# Patient Record
Sex: FEMALE | Race: BLACK OR AFRICAN AMERICAN | Employment: FULL TIME | RURAL
[De-identification: names, ages, dates, MRNs, and addresses within clinical notes are randomized per-mention and may not be internally consistent; named-entity substitution may affect disease eponyms.]

---

## 2017-03-31 ENCOUNTER — OFFICE VISIT (OUTPATIENT)
Dept: FAMILY MEDICINE CLINIC | Age: 52
End: 2017-03-31

## 2017-03-31 VITALS
HEART RATE: 96 BPM | DIASTOLIC BLOOD PRESSURE: 82 MMHG | TEMPERATURE: 98.5 F | RESPIRATION RATE: 20 BRPM | BODY MASS INDEX: 31.75 KG/M2 | WEIGHT: 190.6 LBS | SYSTOLIC BLOOD PRESSURE: 125 MMHG | OXYGEN SATURATION: 100 % | HEIGHT: 65 IN

## 2017-03-31 DIAGNOSIS — E04.9 GOITER: ICD-10-CM

## 2017-03-31 DIAGNOSIS — J01.00 ACUTE MAXILLARY SINUSITIS, RECURRENCE NOT SPECIFIED: Primary | ICD-10-CM

## 2017-03-31 DIAGNOSIS — R68.89 FLU-LIKE SYMPTOMS: ICD-10-CM

## 2017-03-31 LAB
QUICKVUE INFLUENZA TEST: NEGATIVE
VALID INTERNAL CONTROL?: YES

## 2017-03-31 RX ORDER — PREDNISONE 5 MG/1
TABLET ORAL
Qty: 21 TAB | Refills: 0 | Status: SHIPPED | OUTPATIENT
Start: 2017-03-31 | End: 2018-10-24 | Stop reason: ALTCHOICE

## 2017-03-31 RX ORDER — MULTIVIT WITH MINERALS/HERBS
1 TABLET ORAL DAILY
COMMUNITY
End: 2019-11-22

## 2017-03-31 RX ORDER — TRIAMCINOLONE ACETONIDE 55 UG/1
2 SPRAY, METERED NASAL DAILY
Qty: 16.5 BOTTLE | Refills: 2 | Status: SHIPPED | OUTPATIENT
Start: 2017-03-31 | End: 2021-08-18 | Stop reason: ALTCHOICE

## 2017-03-31 RX ORDER — VITAMIN E 268 MG
CAPSULE ORAL DAILY
COMMUNITY
End: 2017-04-19 | Stop reason: ALTCHOICE

## 2017-03-31 RX ORDER — SULFAMETHOXAZOLE AND TRIMETHOPRIM 800; 160 MG/1; MG/1
1 TABLET ORAL 2 TIMES DAILY
Qty: 20 TAB | Refills: 0 | Status: SHIPPED | OUTPATIENT
Start: 2017-03-31 | End: 2017-04-19 | Stop reason: ALTCHOICE

## 2017-03-31 RX ORDER — METFORMIN HYDROCHLORIDE 500 MG/1
TABLET, EXTENDED RELEASE ORAL
Refills: 6 | COMMUNITY
Start: 2017-01-31 | End: 2018-10-24 | Stop reason: SINTOL

## 2017-03-31 RX ORDER — ASCORBIC ACID 500 MG
TABLET ORAL
COMMUNITY
End: 2019-11-22

## 2017-03-31 NOTE — PROGRESS NOTES
Subjective:   Umm Schulte is a 49 y.o. female who complains of congestion, nasal blockage, fever and green nasal discharge for 3 days, gradually worsening since that time. She denies a history of shortness of breath and wheezing. Evaluation to date: none. Treatment to date: antihistamines, OTC products. Patient does not smoke cigarettes. Relevant PMH: No pertinent additional PMH. Patient Active Problem List    Diagnosis Date Noted    Prediabetes 2014    Hypercholesterolemia 2014    Polycystic ovarian syndrome 2011    Goiter 2011     Current Outpatient Prescriptions   Medication Sig Dispense Refill    vitamin E (AQUA GEMS) 400 unit capsule Take  by mouth daily.  ascorbic acid, vitamin C, (VITAMIN C) 500 mg tablet Take  by mouth.  Vit A,C & W-Rmin-X4-Lut-Mn-Glu 1000 unit-300mg -100 unit-2 mg tab Take  by mouth.  zinc 50 mg tab tablet Take  by mouth daily.  b complex vitamins (B COMPLEX 1) tablet Take 1 Tab by mouth daily.  metFORMIN ER (GLUCOPHAGE XR) 500 mg tablet TAKE 1 TABLET BY MOUTH EVERY DAY FOR 30 DAYS  6    LORATADINE (CLARITIN PO) Take  by mouth.  magnesium 500 mg Tab Take  by mouth.  cholecalciferol, vitamin d3, 10,000 unit cap Take  by mouth daily.  acyclovir (ZOVIRAX) 5 % ointment Apply  to affected area every three (3) hours.  2 g 0     Allergies   Allergen Reactions    Biaxin [Clarithromycin] Nausea and Vomiting     dizziness    Ciprofloxacin Other (comments)     Joint pian, dizziness, lightheadedness, confusion    Pcn [Penicillins] Rash     Past Medical History:   Diagnosis Date    GERD (gastroesophageal reflux disease) 2011    Goiter 2011    Hypercholesterolemia 2014    Polycystic ovarian syndrome 2011     Past Surgical History:   Procedure Laterality Date    ABDOMEN SURGERY PROC UNLISTED      DELIVERY   47    HX GYN      c section     Family History   Problem Relation Age of Onset    Diabetes Mother     Alzheimer Mother     Cancer Mother      endometrial cancer    Elevated Lipids Father     Cancer Father      prostate cancer    Diabetes Sister     Cancer Sister      endometrial cancer    Heart Disease Maternal Grandfather     Breast Cancer Paternal Aunt     Breast Cancer Cousin      Social History   Substance Use Topics    Smoking status: Never Smoker    Smokeless tobacco: Never Used    Alcohol use 0.0 oz/week     0 Standard drinks or equivalent per week      Comment: occ wine        Review of Systems  Pertinent items are noted in HPI. She has been to ENDO Dr. Camilla Nielson for PCOS. Started on low dose Metformin. Did not get thyroid US done last year. Objective:     Visit Vitals    /82 (BP 1 Location: Right arm, BP Patient Position: Sitting)    Pulse 96    Temp 98.5 °F (36.9 °C) (Oral)    Resp 20    Ht 5' 5\" (1.651 m)    Wt 190 lb 9.6 oz (86.5 kg)    LMP 03/27/2017    SpO2 100%    BMI 31.72 kg/m2     General:  alert, cooperative, no distress   Eyes: negative   Ears: normal TM's and external ear canals AU   Sinuses: tenderness over bilateral maxillary and frontal   Mouth:  Lips, mucosa, and tongue normal. Teeth and gums normal   Neck: supple, symmetrical, trachea midline and no adenopathy. Heart: S1 and S2 normal, no murmurs noted. Lungs: clear to auscultation bilaterally   Abdomen:         Results for orders placed or performed in visit on 03/31/17   AMB POC RAPID INFLUENZA TEST   Result Value Ref Range    VALID INTERNAL CONTROL POC Yes     QuickVue Influenza test Negative Negative       Assessment/Plan:   sinusitis      ICD-10-CM ICD-9-CM    1. Acute maxillary sinusitis, recurrence not specified J01.00 461.0 predniSONE (STERAPRED) 5 mg dose pack      trimethoprim-sulfamethoxazole (BACTRIM DS) 160-800 mg per tablet      triamcinolone (NASACORT AQ) 55 mcg nasal inhaler   2. Flu-like symptoms R68.89 780.99 AMB POC RAPID INFLUENZA TEST   3.  Goiter E04.9 240.9 US THYROID/PARATHYROID/SOFT TISS   . Request ENDO records from Dr. José Sorto. Order US thyroid. Patient Instructions        Goiter: Care Instructions  Your Care Instructions    A goiter is an enlarged thyroid gland. It can cause swelling in your neck. Your thyroid is found in the front of your neck. It makes a hormone that controls how your body uses energy. Goiters are caused by different things. Some are caused by high or low levels of thyroid hormone. Others are caused by too little iodine in the diet, or a growth or disease in the thyroid. In the [place], most goiters are caused by long-term autoimmune thyroiditis. This is also called Hashimoto's thyroiditis. It happens when the body's immune system damages the thyroid. You may take thyroid hormone to reduce the size of your goiter. Or you may need surgery or radioactive iodine treatment. Some people don't need any treatment. They only need to watch for changes in the goiter. Follow-up care is a key part of your treatment and safety. Be sure to make and go to all appointments, and call your doctor if you are having problems. It's also a good idea to know your test results and keep a list of the medicines you take. How can you care for yourself at home? · Be safe with medicines. Take your medicines exactly as prescribed. Call your doctor if you think you are having a problem with your medicine. You will get more details on the specific medicines your doctor prescribes. When should you call for help? Call 911 anytime you think you may need emergency care. For example, call if:  · You have trouble breathing. Watch closely for changes in your health, and be sure to contact your doctor if:  · Your eyes turn red and bulge. · You have trouble swallowing. · You feel very tired or weak. · You lose weight but are eating the same or more than usual.  Where can you learn more? Go to http://estefani-carl.info/.   Enter Y129 in the search box to learn more about \"Goiter: Care Instructions. \"  Current as of: July 28, 2016  Content Version: 11.2  © 0149-6661 [employer]. Care instructions adapted under license by [employer] (which disclaims liability or warranty for this information). If you have questions about a medical condition or this instruction, always ask your healthcare professional. [place] [address] any warranty or liability for your use of this information.

## 2017-03-31 NOTE — MR AVS SNAPSHOT
Visit Information Date & Time Provider Department Dept. Phone Encounter #  
 [id number]/19/[id number]  4:81 AM Jaspreet [facility] [address] [phone] [id number] Upcoming Health Maintenance Date Due FOBT Q 1 YEAR AGE 50-75 9/8/2016 PAP AKA CERVICAL CYTOLOGY 2/17/2017 BREAST CANCER SCRN MAMMOGRAM 10/12/2018 DTaP/Tdap/Td series (2 - Td) 7/8/2026 Allergies as of 3/31/2017  Review Complete On: 8/78/1033 By: Jaspreet Moreno MD  
  
 Severity Noted Reaction Type Reactions Biaxin [Clarithromycin]  02/18/2011    Nausea and Vomiting  
 dizziness Ciprofloxacin  02/18/2011    Other (comments) Joint pian, dizziness, lightheadedness, confusion Pcn [Penicillins]  02/18/2011    Rash Current Immunizations  Never Reviewed No immunizations on file. Not reviewed this visit You Were Diagnosed With   
  
 Codes Comments Acute maxillary sinusitis, recurrence not specified    -  Primary ICD-10-CM: J01.00 ICD-9-CM: 461.0 Flu-like symptoms     ICD-10-CM: R68.89 ICD-9-CM: 780.99 Goiter     ICD-10-CM: E04.9 ICD-9-CM: 240.9 Vitals BP Pulse Temp Resp Height(growth percentile) Weight(growth percentile) 125/82 (BP 1 Location: Right arm, BP Patient Position: Sitting) 96 98.5 °F (36.9 °C) (Oral) 20 5' 5\" (1.651 m) 190 lb 9.6 oz (86.5 kg) LMP SpO2 BMI OB Status Smoking Status 03/27/2017 100% 31.72 kg/m2 Having regular periods Never Smoker BMI and BSA Data Body Mass Index Body Surface Area 31.72 kg/m 2 1.99 m 2 Preferred Pharmacy Pharmacy Name Phone [facility] [address] [facility] Your Updated Medication List  
  
   
This list is accurate as of: 3/31/17 10:43 AM.  Always use your most recent med list.  
  
  
  
  
 acyclovir 5 % ointment Commonly known as:  ZOVIRAX Apply  to affected area every three (3) hours. B COMPLEX 1 tablet Generic drug:  b complex vitamins Take 1 Tab by mouth daily. cholecalciferol (vitamin d3) 10,000 unit Cap Take  by mouth daily. CLARITIN PO Take  by mouth.  
  
 magnesium 500 mg Tab Take  by mouth.  
  
 metFORMIN  mg tablet Commonly known as:  GLUCOPHAGE XR  
TAKE 1 TABLET BY MOUTH EVERY DAY FOR 30 DAYS  
  
 predniSONE 5 mg dose pack Commonly known as:  STERAPRED See administration instruction per 5mg dose pack  
  
 triamcinolone 55 mcg nasal inhaler Commonly known as:  NASACORT AQ  
2 Sprays by Both Nostrils route daily. trimethoprim-sulfamethoxazole 160-800 mg per tablet Commonly known as:  BACTRIM DS Take 1 Tab by mouth two (2) times a day. Vit A,C & I-Oebi-S9-Lut-Mn-Glu 1000 unit-300mg -100 unit-2 mg Tab Take  by mouth. VITAMIN C 500 mg tablet Generic drug:  ascorbic acid (vitamin C) Take  by mouth.  
  
 vitamin E 400 unit capsule Commonly known as:  [address] Take  by mouth daily. zinc 50 mg Tab tablet Take  by mouth daily. Prescriptions Sent to Pharmacy Refills  
 predniSONE (STERAPRED) 5 mg dose pack 0 Sig: See administration instruction per 5mg dose pack Class: Normal  
 Pharmacy: [place] [id number] Ph #: [phone]  
 trimethoprim-sulfamethoxazole (BACTRIM DS) 160-800 mg per tablet 0 Sig: Take 1 Tab by mouth two (2) times a day. Class: Normal  
 Pharmacy: [facility] [id number] [place] [address] Ph #: [phone] Route: Oral  
 triamcinolone (NASACORT AQ) 55 mcg nasal inhaler 2 Si Sprays by Both Nostrils route daily. Class: Normal  
 Pharmacy: [facility] [id number] [place] [address] Ph #: [phone] Route: Both Nostrils We Performed the Following AMB POC RAPID INFLUENZA TEST [[address] CPT(R)] To-Do List   
 2017 Imaging:  US THYROID/PARATHYROID/SOFT TISS Patient Instructions Goiter: Care Instructions Your Care Instructions A goiter is an enlarged thyroid gland. It can cause swelling in your neck. Your thyroid is found in the front of your neck. It makes a hormone that controls how your body uses energy. Goiters are caused by different things. Some are caused by high or low levels of thyroid hormone. Others are caused by too little iodine in the diet, or a growth or disease in the thyroid. In the [place], most goiters are caused by long-term autoimmune thyroiditis. This is also called Hashimoto's thyroiditis. It happens when the body's immune system damages the thyroid. You may take thyroid hormone to reduce the size of your goiter. Or you may need surgery or radioactive iodine treatment. Some people don't need any treatment. They only need to watch for changes in the goiter. Follow-up care is a key part of your treatment and safety. Be sure to make and go to all appointments, and call your doctor if you are having problems. It's also a good idea to know your test results and keep a list of the medicines you take. How can you care for yourself at home? · Be safe with medicines. Take your medicines exactly as prescribed. Call your doctor if you think you are having a problem with your medicine. You will get more details on the specific medicines your doctor prescribes. When should you call for help? Call 911 anytime you think you may need emergency care. For example, call if: 
· You have trouble breathing. Watch closely for changes in your health, and be sure to contact your doctor if: 
· Your eyes turn red and bulge. · You have trouble swallowing. · You feel very tired or weak. · You lose weight but are eating the same or more than usual. 
Where can you learn more? Go to http://estefani-carl.info/. Enter U150 in the search box to learn more about \"Goiter: Care Instructions. \" Current as of: July 28, 2016 Content Version: 11.2 © 0478-1770 [employer], Incorporated. Care instructions adapted under license by [employer] (which disclaims liability or warranty for this information). If you have questions about a medical condition or this instruction, always ask your healthcare professional. [address] any warranty or liability for your use of this information. Introducing [facility]! Dear Nicole Brannon: Thank you for requesting a Picapica account. Our records indicate that you already have an active Picapica account. You can access your account anytime at [email or web address] ([email or web address]). Jirafe/[facility] ([facility]) Did you know that you can access your hospital and ER discharge instructions at any time in Picapica? You can also review all of your test results from your hospital stay or ER visit. Additional Information If you have questions, please visit the Frequently Asked Questions section of the Picapica website at [email or web address]. Remember, Picapica is NOT to be used for urgent needs. For medical emergencies, dial 911. Now available from your iPhone and Android! Please provide this summary of care documentation to your next provider. If you have any questions after today's visit, please call [phone].

## 2017-03-31 NOTE — PATIENT INSTRUCTIONS
Goiter: Care Instructions  Your Care Instructions    A goiter is an enlarged thyroid gland. It can cause swelling in your neck. Your thyroid is found in the front of your neck. It makes a hormone that controls how your body uses energy. Goiters are caused by different things. Some are caused by high or low levels of thyroid hormone. Others are caused by too little iodine in the diet, or a growth or disease in the thyroid. In the [place], most goiters are caused by long-term autoimmune thyroiditis. This is also called Hashimoto's thyroiditis. It happens when the body's immune system damages the thyroid. You may take thyroid hormone to reduce the size of your goiter. Or you may need surgery or radioactive iodine treatment. Some people don't need any treatment. They only need to watch for changes in the goiter. Follow-up care is a key part of your treatment and safety. Be sure to make and go to all appointments, and call your doctor if you are having problems. It's also a good idea to know your test results and keep a list of the medicines you take. How can you care for yourself at home? · Be safe with medicines. Take your medicines exactly as prescribed. Call your doctor if you think you are having a problem with your medicine. You will get more details on the specific medicines your doctor prescribes. When should you call for help? Call 911 anytime you think you may need emergency care. For example, call if:  · You have trouble breathing. Watch closely for changes in your health, and be sure to contact your doctor if:  · Your eyes turn red and bulge. · You have trouble swallowing. · You feel very tired or weak. · You lose weight but are eating the same or more than usual.  Where can you learn more? Go to http://estefani-carl.info/. Enter G144 in the search box to learn more about \"Goiter: Care Instructions. \"  Current as of: July 28, 2016  Content Version: 11.2  © 4739-5675 [employer]. Care instructions adapted under license by [employer] (which disclaims liability or warranty for this information). If you have questions about a medical condition or this instruction, always ask your healthcare professional. [address] any warranty or liability for your use of this information.

## 2017-03-31 NOTE — PROGRESS NOTES
Identified pt with two pt identifiers(name and ). Chief Complaint   Patient presents with    Cold Symptoms     started wed     Fever     low grade     Sinus Infection     face pain, teeth hurt        Health Maintenance Due   Topic    FOBT Q 1 YEAR AGE 50-75     PAP AKA CERVICAL CYTOLOGY    10/16 Dr Rafael Batres from Last 3 Encounters:   17 190 lb 9.6 oz (86.5 kg)   16 193 lb (87.5 kg)   12/14/15 192 lb (87.1 kg)     Temp Readings from Last 3 Encounters:   17 98.5 °F (36.9 °C) (Oral)   16 97.8 °F (36.6 °C) (Oral)   12/14/15 98.1 °F (36.7 °C) (Oral)     BP Readings from Last 3 Encounters:   17 125/82   16 101/67   12/14/15 111/81     Pulse Readings from Last 3 Encounters:   17 96   16 86   12/14/15 89         Learning Assessment:  :     Learning Assessment 2014   PRIMARY LEARNER Patient   HIGHEST LEVEL OF EDUCATION - PRIMARY LEARNER  > 4 YEARS OF COLLEGE   BARRIERS PRIMARY LEARNER NONE   CO-LEARNER CAREGIVER No   PRIMARY LANGUAGE ENGLISH   LEARNER PREFERENCE PRIMARY DEMONSTRATION     READING   ANSWERED BY self   RELATIONSHIP SELF       Depression Screening:  :     PHQ 2 / 9, over the last two weeks 3/31/2017   Little interest or pleasure in doing things Not at all   Feeling down, depressed or hopeless Not at all   Total Score PHQ 2 0       Fall Risk Assessment:  :     Fall Risk Assessment, last 12 mths 3/31/2017   Able to walk? Yes   Fall in past 12 months? No       Abuse Screening:  :     Abuse Screening Questionnaire 3/31/2017 2014   Do you ever feel afraid of your partner? N N   Are you in a relationship with someone who physically or mentally threatens you? N N   Is it safe for you to go home?  Y Y       Coordination of Care Questionnaire:  :     1) Have you been to an emergency room, urgent care clinic since your last visit? no   Hospitalized since your last visit? no             2) Have you seen or consulted any other health care providers outside of [address] [place] [address] since your last visit? yes  Dr Matias Nose endocrinologist  (Include any pap smears or colon screenings in this section.)    3) Do you have an Advance Directive on file? yes  Are you interested in receiving information about Advance Directives? no    Reviewed record in preparation for visit and have obtained necessary documentation. Medication reconciliation up to date and corrected with patient at this time.      Results for orders placed or performed in visit on 03/31/17   AMB POC RAPID INFLUENZA TEST   Result Value Ref Range    VALID INTERNAL CONTROL POC Yes     QuickVue Influenza test Negative Negative

## 2017-04-03 ENCOUNTER — TELEPHONE (OUTPATIENT)
Dept: FAMILY MEDICINE CLINIC | Age: 52
End: 2017-04-03

## 2017-04-03 RX ORDER — AZITHROMYCIN 250 MG/1
TABLET, FILM COATED ORAL
Qty: 6 TAB | Refills: 0 | Status: SHIPPED | OUTPATIENT
Start: 2017-04-03 | End: 2017-04-08

## 2017-04-03 NOTE — TELEPHONE ENCOUNTER
Forwarded from call center. .. Pt stated that she would like for a call back in regards to her prescription that was written for her today. She stated that it is causing her abdominal pain and that she would like for something else to be sent to her [facility] pharmacy at [phone] for her.  Her best contact is [phone].

## 2017-04-03 NOTE — TELEPHONE ENCOUNTER
Patient called regarding previous message from call center and is worried about having this medication reaction. Patient requests a call back from Dr Kim Kingsley or nurse as soon as they may have a chance.      Best contact: [phone]

## 2017-04-12 ENCOUNTER — HOSPITAL ENCOUNTER (OUTPATIENT)
Dept: ULTRASOUND IMAGING | Age: 52
Discharge: HOME | End: 2017-04-12
Attending: FAMILY MEDICINE
Payer: COMMERCIAL

## 2017-04-12 DIAGNOSIS — E04.9 GOITER: ICD-10-CM

## 2017-04-12 PROCEDURE — 76536 US EXAM OF HEAD AND NECK: CPT

## 2017-04-13 ENCOUNTER — TELEPHONE (OUTPATIENT)
Dept: FAMILY MEDICINE CLINIC | Age: 52
End: 2017-04-13

## 2017-04-13 DIAGNOSIS — E04.1 LEFT THYROID NODULE: Primary | ICD-10-CM

## 2017-04-13 DIAGNOSIS — E04.1 NODULE OF LEFT LOBE OF THYROID GLAND: ICD-10-CM

## 2017-04-13 NOTE — TELEPHONE ENCOUNTER
Call pt to discuss neck US. Refer to ENDO for BX of large left thyroid nodule.  She has seen Dr. Kwesi Cortes in past.

## 2017-04-17 NOTE — TELEPHONE ENCOUNTER
Spoke with pt. She has most recently seen Dr. Esthela Kasper at [address] [place] [address] Drive last July for PCOS. We will fax to him the US report and pt will set up appt to evaluate large thyroid nodule. Please fax US report to Dr. Clary Sykes office.   [facility] phone # [phone]

## 2017-04-19 ENCOUNTER — OFFICE VISIT (OUTPATIENT)
Dept: INTERNAL MEDICINE CLINIC | Age: 52
End: 2017-04-19

## 2017-04-19 VITALS
HEIGHT: 65 IN | BODY MASS INDEX: 31.72 KG/M2 | HEART RATE: 93 BPM | TEMPERATURE: 98.2 F | WEIGHT: 190.4 LBS | RESPIRATION RATE: 16 BRPM | OXYGEN SATURATION: 97 % | SYSTOLIC BLOOD PRESSURE: 123 MMHG | DIASTOLIC BLOOD PRESSURE: 75 MMHG

## 2017-04-19 DIAGNOSIS — E04.1 LEFT THYROID NODULE: ICD-10-CM

## 2017-04-19 DIAGNOSIS — E04.9 GOITER: Primary | ICD-10-CM

## 2017-04-19 NOTE — PROGRESS NOTES
Reviewed record  In preparation for visit and have obtained necessary documentation. 1. Have you been to the ER, urgent care clinic since your last visit? Hospitalized since your last visit?no  2. Have you seen or consulted any other health care providers outside of the [address] [place] [address] since your last visit? Include any pap smears or colon screening. Saw PCP Dr Karina Abrams and Dr Mina(endo)  Advanced directives on file.

## 2017-04-19 NOTE — PATIENT INSTRUCTIONS
Thyroid Nodules: Care Instructions  Your Care Instructions  Thyroid nodules are growths or lumps in the thyroid gland. Your thyroid is in the front of your neck. It controls how your body uses energy. You may have tests to see if the nodule is caused by cancer. Most nodules aren't cancer and don't cause problems. Many don't even need treatment. If you do have cancer, it can usually be cured. Treatment will probably include surgery. You may also get radioactive iodine treatment. If your thyroid can't make thyroid hormone after treatment, you can take a pill every day to replace the hormone. Follow-up care is a key part of your treatment and safety. Be sure to make and go to all appointments, and call your doctor if you are having problems. It's also a good idea to know your test results and keep a list of the medicines you take. How can you care for yourself at home? · Be safe with medicines. If you take thyroid hormone medicine:  ¨ Take it exactly as prescribed. Call your doctor if you think you are having a problem with your medicine. If you take the right amount and don't skip doses, you probably won't have side effects. ¨ Do not take it with calcium, vitamins, or iron. ¨ Try not to miss a dose. ¨ Do not take extra doses. This will not help you get better any faster. It may also cause side effects. ¨ Tell your doctor about any medicines you take. This includes over-the-counter medicines. ¨ Wear a medical alert bracelet or necklace that says you take thyroid hormones. You can buy these at most drugstores. When should you call for help? Call 911 anytime you think you may need emergency care. For example, call if:  · You lose consciousness. Call your doctor now or seek immediate medical care if:  · You have shortness of breath. Watch closely for changes in your health, and be sure to contact your doctor if:  · You have pain in your neck, jaw, or ear. · You have problems swallowing.   · You have a \"tickle\" in your throat. · You feel weak and tired. · You have nervousness, a fast heartbeat, hand tremors, problems sleeping, increased sweating, and weight loss. · You do not feel better even though you are taking your medicine. Where can you learn more? Go to http://estefani-carl.info/. Enter G405 in the search box to learn more about \"Thyroid Nodules: Care Instructions. \"  Current as of: July 28, 2016  Content Version: 11.2  © 3149-2695 [employer]. Care instructions adapted under license by [employer] (which disclaims liability or warranty for this information). If you have questions about a medical condition or this instruction, always ask your healthcare professional. [address] any warranty or liability for your use of this information. Fine-Needle Thyroid Biopsy: Before Your Procedure  What is a needle thyroid biopsy? During a thyroid biopsy, your doctor uses a thin needle to remove a small sample of tissue from your thyroid gland. You may be having the biopsy to find what is causing a lump or growth in your thyroid. The biopsy causes very little pain. But your doctor may need to put the needle into your thyroid more than once. This is done to be sure enough fluid and tissue is taken for the test.  The doctor then looks at the tissue sample under a microscope for cancer, infection, or other thyroid problems. The biopsy is done in a hospital, a clinic, or your doctor's office. During the test, you will lie on your back with a pillow under your shoulders. Your head will be tipped backward and your neck extended. This position pushes the thyroid gland forward. This makes it easier to do the biopsy. You may be given medicine to help you relax. Your doctor may use an ultrasound to guide the placement of the needle. It is important to lie very still during the biopsy.  Do not cough, talk, or swallow when the needle is in place. In some cases, thyroid surgery may be needed if a needle biopsy doesn't give a clear result. This would be done at a different time. In this surgery, the doctor takes a tissue sample through a cut (incision) in the skin. Follow-up care is a key part of your treatment and safety. Be sure to make and go to all appointments, and call your doctor if you are having problems. It's also a good idea to know your test results and keep a list of the medicines you take. What happens before the procedure? Procedures can be stressful. This information will help you understand what you can expect. And it will help you safely prepare for your procedure. You do not need to do anything before your biopsy. You will be awake during the biopsy. Preparing for the procedure  · Understand exactly what procedure is planned, along with the risks, benefits, and other options. · Tell your doctors ALL the medicines, vitamins, supplements, and herbal remedies you take. Some of these can increase the risk of bleeding or interact with anesthesia. · If you take blood thinners, such as warfarin (Coumadin), clopidogrel (Plavix), or aspirin, be sure to talk to your doctor. He or she will tell you if you should stop taking these medicines before your procedure. Make sure that you understand exactly what your doctor wants you to do. · Your doctor will tell you which medicines to take or stop before your procedure. You may need to stop taking certain medicines a week or more before the procedure. So talk to your doctor as soon as you can. · If you have an advance directive, let your doctor know. It may include a living will and a durable power of  for health care. Bring a copy to the hospital. If you don't have one, you may want to prepare one. It lets your doctor and loved ones know your health care wishes. Doctors advise that everyone prepare these papers before any type of surgery or procedure.   What happens on the day of the procedure? · Follow the instructions exactly about when to stop eating and drinking. If you don't, your procedure may be canceled. If your doctor told you to take your medicines on the day of the procedure, take them with only a sip of water. · Take a bath or shower before you come in for your procedure. Do not apply lotions, perfumes, deodorants, or nail polish. · Take off all jewelry and piercings. And take out contact lenses, if you wear them. At the hospital or surgery center  · Bring a picture ID. · The procedure will take about 5 to 10 minutes. Going home  · You may need to stay in the hospital overnight. · Be sure you have someone to drive you home. Anesthesia and pain medicine make it unsafe for you to drive. · You will be given more specific instructions about recovering from your procedure. They will cover things like diet, wound care, follow-up care, driving, and getting back to your normal routine. When should you call your doctor? · You have questions or concerns. · You don't understand how to prepare for your procedure. · You become ill before the procedure (such as fever, flu, or a cold). · You need to reschedule or have changed your mind about having the procedure. Where can you learn more? Go to http://estefani-carl.info/. Enter J510 in the search box to learn more about \"Fine-Needle Thyroid Biopsy: Before Your Procedure. \"  Current as of: July 28, 2016  Content Version: 11.2  © 6109-1150 [employer]. Care instructions adapted under license by [employer] (which disclaims liability or warranty for this information). If you have questions about a medical condition or this instruction, always ask your healthcare professional. [place] [address] any warranty or liability for your use of this information.

## 2017-04-19 NOTE — PROGRESS NOTES
CC:  Chief Complaint   Patient presents with    Thyroid Problem     patient wants a second option on thyroid     HISTORY OF PRESENT ILLNESS  Kaz Grimes is a 49 y.o. female. Presents for second opinion regarding her thyroid. Has thyroid goiter; thyroid US on 4/12/17 showed 3.5 cm left-sided solid thyroid nodule. Dr. Vladislav Latham, her PCP, told her she needs a thyroid biopsy. She tried to see Dr. Deborah Darby [facility]) who manages her PCOS but not able to get an appointment with him until 10/17. She wants to get thyroid biopsy before that time. Has had normal thyroid blood tests in the past; last checked in fall 2016 at [facility]. She has noticed mild discomfort swallowing and sometimes breathing due to thyroid goiter. Has cold intolerance and hair thinning. Denies fatigue, significant weight changes, bowel/skin changes or cardiovascular symptoms.     ROS  Constitutional: negative for fevers, chills, night sweats  ENT:   negative for sore throat, nasal congestion  Respiratory:  negative for cough, wheezing  CV:   negative for chest pain, palpitations, lower extremity edema  GI:   negative for heartburn, abd pain, nausea, vomiting, diarrhea, constipation  Genitourinary: negative for frequency, dysuria and hematuria  Musculoskel: negative for myalgias, arthralgias, back pain, muscle weakness, joint pain  Neurological:  negative for headaches, dizziness  Behavl/Psych: negative for feelings of anxiety, depression, mood change     Patient Active Problem List   Diagnosis Code    Polycystic ovarian syndrome E28.2    Goiter E04.9    Prediabetes R73.03    Hypercholesterolemia E78.00     Past Medical History:   Diagnosis Date    GERD (gastroesophageal reflux disease) 2/18/2011    Goiter 2/18/2011    Hypercholesterolemia 7/30/2014    Polycystic ovarian syndrome 2/18/2011     Allergies   Allergen Reactions    Bactrim [Sulfamethoprim] Diarrhea and Nausea Only     severe nausea/gas pain/    Biaxin [Clarithromycin] Nausea and Vomiting     dizziness    Ciprofloxacin Other (comments)     Joint pian, dizziness, lightheadedness, confusion    Pcn [Penicillins] Rash     Current Outpatient Prescriptions   Medication Sig Dispense Refill    ascorbic acid, vitamin C, (VITAMIN C) 500 mg tablet Take  by mouth.  b complex vitamins (B COMPLEX 1) tablet Take 1 Tab by mouth daily.  metFORMIN ER (GLUCOPHAGE XR) 500 mg tablet TAKE 1 TABLET BY MOUTH EVERY DAY FOR 30 DAYS  6    triamcinolone (NASACORT AQ) 55 mcg nasal inhaler 2 Sprays by Both Nostrils route daily. 16.5 Bottle 2    LORATADINE (CLARITIN PO) Take  by mouth as needed.  acyclovir (ZOVIRAX) 5 % ointment Apply  to affected area every three (3) hours. 2 g 0    magnesium 500 mg Tab Take  by mouth.  predniSONE (STERAPRED) 5 mg dose pack See administration instruction per 5mg dose pack 21 Tab 0         PHYSICAL EXAM  Visit Vitals    /75 (BP 1 Location: Left arm, BP Patient Position: Sitting)    Pulse 93    Temp 98.2 °F (36.8 °C) (Oral)    Resp 16    Ht 5' 5\" (1.651 m)    Wt 190 lb 6.4 oz (86.4 kg)    LMP 03/31/2017    SpO2 97%    BMI 31.68 kg/m2       General: Obese, no distress. HEENT:  Head normocephalic/atraumatic, no scleral icterus. Mild hirsutism at chin; mild hair thinning. Neck: Supple. No lymphadenopathy. Thyroid enlarged diffusely but larger on left side; palpable, soft, non-tender, mobile nodule at left lobe of thyroid. Lungs:  Clear to ausculation bilaterally. Good air movement. Heart:  Regular rate and rhythm, normal S1 and S2, no murmur, gallop, or rub  Extremities: No clubbing, cyanosis, or edema. Neurological: Alert and oriented. Psychiatric: Normal mood and affect. Behavior is normal.         ASSESSMENT AND PLAN    ICD-10-CM ICD-9-CM    1. Goiter E04.9 240.9 TSH AND FREE T4      THYROID PEROXIDASE (TPO) AB   2. Left thyroid nodule E04.1 241.0        Yelitza Urbina was seen today for thyroid problem.     Diagnoses and all orders for this visit:    Goiter  -     TSH AND FREE T4  -     THYROID PEROXIDASE (TPO) AB    I recommend Dr. Phylicia Rebolledo order the following so that the thyroid biopsy can be done at [facility] by Radiology and reviewed by Pathology:        [address]        Associate with diagnosis: Left thyroid nodule    Follow-up Disposition:  Return if symptoms worsen or fail to improve. I explained to her that she does not need to follow up with me. Provided patient and/or family with advanced directive information and answered pertinent questions. Encouraged patient to provide a copy of advanced directive to the office when available. I have discussed the diagnosis with the patient and the intended plan as seen in the above orders. Patient is in agreement. The patient has received an after-visit summary and questions were answered concerning future plans. I have discussed medication side effects and warnings with the patient as well.

## 2017-04-19 NOTE — MR AVS SNAPSHOT
Visit Information Date & Time Provider Department Dept. Phone Encounter #  
 4/19/2017  1:00 PM Doug OliverosMacario [phone] [id number] Follow-up Instructions Return if symptoms worsen or fail to improve. Upcoming Health Maintenance Date Due FOBT Q 1 YEAR AGE 50-75 9/8/2016 PAP AKA CERVICAL CYTOLOGY 2/17/2017 BREAST CANCER SCRN MAMMOGRAM 10/12/2018 DTaP/Tdap/Td series (2 - Td) 7/8/2026 Allergies as of 4/19/2017  Review Complete On: 4/19/2017 By: Doug Oliveros MD  
  
 Severity Noted Reaction Type Reactions Bactrim [Sulfamethoprim]  04/19/2017    Diarrhea, Nausea Only  
 severe nausea/gas pain/  
 Biaxin [Clarithromycin]  02/18/2011    Nausea and Vomiting  
 dizziness Ciprofloxacin  02/18/2011    Other (comments) Joint pian, dizziness, lightheadedness, confusion Pcn [Penicillins]  02/18/2011    Rash Current Immunizations  Never Reviewed No immunizations on file. Not reviewed this visit You Were Diagnosed With   
  
 Codes Comments Goiter    -  Primary ICD-10-CM: E04.9 ICD-9-CM: 240.9 Vitals BP Pulse Temp Resp Height(growth percentile) Weight(growth percentile) 123/75 (BP 1 Location: Left arm, BP Patient Position: Sitting) 93 98.2 °F (36.8 °C) (Oral) 16 5' 5\" (1.651 m) 190 lb 6.4 oz (86.4 kg) LMP SpO2 BMI OB Status Smoking Status 03/31/2017 97% 31.68 kg/m2 Having regular periods Never Smoker BMI and BSA Data Body Mass Index Body Surface Area  
 31.68 kg/m 2 1.99 m 2 Preferred Pharmacy Pharmacy Name Phone [facility] [place], [address]  Your Updated Medication List  
  
   
This list is accurate as of: 4/19/17  1:28 PM.  Always use your most recent med list.  
  
  
  
  
 acyclovir 5 % ointment Commonly known as:  ZOVIRAX Apply  to affected area every three (3) hours. B COMPLEX 1 tablet Generic drug:  b complex vitamins Take 1 Tab by mouth daily. CLARITIN PO Take  by mouth as needed. magnesium 500 mg Tab Take  by mouth.  
  
 metFORMIN  mg tablet Commonly known as:  GLUCOPHAGE XR  
TAKE 1 TABLET BY MOUTH EVERY DAY FOR 30 DAYS  
  
 predniSONE 5 mg dose pack Commonly known as:  STERAPRED See administration instruction per 5mg dose pack  
  
 triamcinolone 55 mcg nasal inhaler Commonly known as:  NASACORT AQ  
2 Sprays by Both Nostrils route daily. VITAMIN C 500 mg tablet Generic drug:  ascorbic acid (vitamin C) Take  by mouth. We Performed the Following THYROID PEROXIDASE (TPO) AB [18104 CPT(R)] TSH AND FREE T4 [64162 CPT(R)] Follow-up Instructions Return if symptoms worsen or fail to improve. Patient Instructions Thyroid Nodules: Care Instructions Your Care Instructions Thyroid nodules are growths or lumps in the thyroid gland. Your thyroid is in the front of your neck. It controls how your body uses energy. You may have tests to see if the nodule is caused by cancer. Most nodules aren't cancer and don't cause problems. Many don't even need treatment. If you do have cancer, it can usually be cured. Treatment will probably include surgery. You may also get radioactive iodine treatment. If your thyroid can't make thyroid hormone after treatment, you can take a pill every day to replace the hormone. Follow-up care is a key part of your treatment and safety. Be sure to make and go to all appointments, and call your doctor if you are having problems. It's also a good idea to know your test results and keep a list of the medicines you take. How can you care for yourself at home? · Be safe with medicines. If you take thyroid hormone medicine: ¨ Take it exactly as prescribed. Call your doctor if you think you are having a problem with your medicine.  If you take the right amount and don't skip doses, you probably won't have side effects. ¨ Do not take it with calcium, vitamins, or iron. ¨ Try not to miss a dose. ¨ Do not take extra doses. This will not help you get better any faster. It may also cause side effects. ¨ Tell your doctor about any medicines you take. This includes over-the-counter medicines. ¨ Wear a medical alert bracelet or necklace that says you take thyroid hormones. You can buy these at most drugstores. When should you call for help? Call 911 anytime you think you may need emergency care. For example, call if: 
· You lose consciousness. Call your doctor now or seek immediate medical care if: 
· You have shortness of breath. Watch closely for changes in your health, and be sure to contact your doctor if: 
· You have pain in your neck, jaw, or ear. · You have problems swallowing. · You have a \"tickle\" in your throat. · You feel weak and tired. · You have nervousness, a fast heartbeat, hand tremors, problems sleeping, increased sweating, and weight loss. · You do not feel better even though you are taking your medicine. Where can you learn more? Go to http://estefani-carl.info/. Enter E139 in the search box to learn more about \"Thyroid Nodules: Care Instructions. \" Current as of: July 28, 2016 Content Version: 11.2 © 1629-1064 [employer]. Care instructions adapted under license by [employer] (which disclaims liability or warranty for this information). If you have questions about a medical condition or this instruction, always ask your healthcare professional. [address] any warranty or liability for your use of this information. Fine-Needle Thyroid Biopsy: Before Your Procedure What is a needle thyroid biopsy? During a thyroid biopsy, your doctor uses a thin needle to remove a small sample of tissue from your thyroid gland.  You may be having the biopsy to find what is causing a lump or growth in your thyroid. The biopsy causes very little pain. But your doctor may need to put the needle into your thyroid more than once. This is done to be sure enough fluid and tissue is taken for the test. 
The doctor then looks at the tissue sample under a microscope for cancer, infection, or other thyroid problems. The biopsy is done in a hospital, a clinic, or your doctor's office. During the test, you will lie on your back with a pillow under your shoulders. Your head will be tipped backward and your neck extended. This position pushes the thyroid gland forward. This makes it easier to do the biopsy. You may be given medicine to help you relax. Your doctor may use an ultrasound to guide the placement of the needle. It is important to lie very still during the biopsy. Do not cough, talk, or swallow when the needle is in place. In some cases, thyroid surgery may be needed if a needle biopsy doesn't give a clear result. This would be done at a different time. In this surgery, the doctor takes a tissue sample through a cut (incision) in the skin. Follow-up care is a key part of your treatment and safety. Be sure to make and go to all appointments, and call your doctor if you are having problems. It's also a good idea to know your test results and keep a list of the medicines you take. What happens before the procedure? Procedures can be stressful. This information will help you understand what you can expect. And it will help you safely prepare for your procedure. You do not need to do anything before your biopsy. You will be awake during the biopsy. Preparing for the procedure · Understand exactly what procedure is planned, along with the risks, benefits, and other options. · Tell your doctors ALL the medicines, vitamins, supplements, and herbal remedies you take. Some of these can increase the risk of bleeding or interact with anesthesia. · If you take blood thinners, such as warfarin (Coumadin), clopidogrel (Plavix), or aspirin, be sure to talk to your doctor. He or she will tell you if you should stop taking these medicines before your procedure. Make sure that you understand exactly what your doctor wants you to do. · Your doctor will tell you which medicines to take or stop before your procedure. You may need to stop taking certain medicines a week or more before the procedure. So talk to your doctor as soon as you can. · If you have an advance directive, let your doctor know. It may include a living will and a durable power of  for health care. Bring a copy to the hospital. If you don't have one, you may want to prepare one. It lets your doctor and loved ones know your health care wishes. Doctors advise that everyone prepare these papers before any type of surgery or procedure. What happens on the day of the procedure? · Follow the instructions exactly about when to stop eating and drinking. If you don't, your procedure may be canceled. If your doctor told you to take your medicines on the day of the procedure, take them with only a sip of water. · Take a bath or shower before you come in for your procedure. Do not apply lotions, perfumes, deodorants, or nail polish. · Take off all jewelry and piercings. And take out contact lenses, if you wear them. At the hospital or surgery center · Bring a picture ID. · The procedure will take about 5 to 10 minutes. Going home · You may need to stay in the hospital overnight. · Be sure you have someone to drive you home. Anesthesia and pain medicine make it unsafe for you to drive. · You will be given more specific instructions about recovering from your procedure. They will cover things like diet, wound care, follow-up care, driving, and getting back to your normal routine. When should you call your doctor? · You have questions or concerns. · You don't understand how to prepare for your procedure. · You become ill before the procedure (such as fever, flu, or a cold). · You need to reschedule or have changed your mind about having the procedure. Where can you learn more? Go to http://estefani-carl.info/. Enter J510 in the search box to learn more about \"Fine-Needle Thyroid Biopsy: Before Your Procedure. \" Current as of: July 28, 2016 Content Version: 11.2 © 3732-7125 [employer]. Care instructions adapted under license by [employer] (which disclaims liability or warranty for this information). If you have questions about a medical condition or this instruction, always ask your healthcare professional. [address] any warranty or liability for your use of this information. Introducing [facility]! Dear Mahin Patient: Thank you for requesting a [employer] account. Our records indicate that you already have an active [employer] account. You can access your account anytime at [email or web address] Did you know that you can access your hospital and ER discharge instructions at any time in [employer]? You can also review all of your test results from your hospital stay or ER visit. Additional Information If you have questions, please visit the Frequently Asked Questions section of the [employer] website at [email or web address] Remember, [employer] is NOT to be used for urgent needs. For medical emergencies, dial 911. Now available from your iPhone and Android! Please provide this summary of care documentation to your next provider. Your primary care clinician is listed as Judie Wilkerson. If you have any questions after today's visit, please call [phone].

## 2017-04-20 LAB
T4 FREE SERPL-MCNC: 1.14 NG/DL (ref 0.82–1.77)
THYROPEROXIDASE AB SERPL-ACNC: 10 IU/ML (ref 0–34)
TSH SERPL DL<=0.005 MIU/L-ACNC: 0.72 UIU/ML (ref 0.45–4.5)

## 2017-04-20 NOTE — PROGRESS NOTES
Your thyroid blood tests returned normal.  Dr. Ranulfo Stark: Please see my note from 4/19/17 and order thyroid biopsy as discussed in plan.  Thanks, Dr. Lucretia Augustine

## 2017-04-24 NOTE — PROGRESS NOTES
Spoke with patient and after verifying name and date of birth of patient gave her test results per Dr. Mirna Mchugh.

## 2017-09-21 ENCOUNTER — OFFICE VISIT (OUTPATIENT)
Dept: FAMILY MEDICINE CLINIC | Age: 52
End: 2017-09-21

## 2017-09-21 VITALS
SYSTOLIC BLOOD PRESSURE: 128 MMHG | HEIGHT: 65 IN | HEART RATE: 76 BPM | DIASTOLIC BLOOD PRESSURE: 85 MMHG | TEMPERATURE: 98.3 F | BODY MASS INDEX: 31.49 KG/M2 | OXYGEN SATURATION: 96 % | WEIGHT: 189 LBS | RESPIRATION RATE: 18 BRPM

## 2017-09-21 DIAGNOSIS — R30.0 DYSURIA: ICD-10-CM

## 2017-09-21 DIAGNOSIS — N30.00 ACUTE CYSTITIS WITHOUT HEMATURIA: ICD-10-CM

## 2017-09-21 DIAGNOSIS — M54.5 LOW BACK PAIN, UNSPECIFIED BACK PAIN LATERALITY, UNSPECIFIED CHRONICITY, WITH SCIATICA PRESENCE UNSPECIFIED: Primary | ICD-10-CM

## 2017-09-21 DIAGNOSIS — J02.9 SORE THROAT: ICD-10-CM

## 2017-09-21 LAB
BILIRUB UR QL STRIP: NEGATIVE
GLUCOSE UR-MCNC: NEGATIVE MG/DL
KETONES P FAST UR STRIP-MCNC: NEGATIVE MG/DL
PH UR STRIP: 6.5 [PH] (ref 4.6–8)
PROT UR QL STRIP: NEGATIVE MG/DL
S PYO AG THROAT QL: NEGATIVE
SP GR UR STRIP: 1.02 (ref 1–1.03)
UA UROBILINOGEN AMB POC: NORMAL (ref 0.2–1)
URINALYSIS CLARITY POC: NORMAL
URINALYSIS COLOR POC: NORMAL
URINE BLOOD POC: NEGATIVE
URINE LEUKOCYTES POC: NORMAL
URINE NITRITES POC: NEGATIVE
VALID INTERNAL CONTROL?: YES

## 2017-09-21 RX ORDER — NITROFURANTOIN (MACROCRYSTALS) 100 MG/1
100 CAPSULE ORAL 2 TIMES DAILY
Qty: 20 CAP | Refills: 0 | Status: SHIPPED | OUTPATIENT
Start: 2017-09-21 | End: 2017-10-01

## 2017-09-21 NOTE — MR AVS SNAPSHOT
Visit Information Date & Time Provider Department Dept. Phone Encounter #  
 9/21/2017  4:00 PM Jack PinedaBarrie [phone] [id number] Upcoming Health Maintenance Date Due FOBT Q 1 YEAR AGE 50-75 9/8/2016 INFLUENZA AGE 9 TO ADULT 8/1/2017 BREAST CANCER SCRN MAMMOGRAM 10/12/2018 PAP AKA CERVICAL CYTOLOGY 11/11/2019 DTaP/Tdap/Td series (2 - Td) 7/8/2026 Allergies as of 9/21/2017  Review Complete On: 9/21/2017 By: Jack Pineda MD  
  
 Severity Noted Reaction Type Reactions Bactrim [Sulfamethoprim]  04/19/2017    Diarrhea, Nausea Only  
 severe nausea/gas pain/  
 Biaxin [Clarithromycin]  02/18/2011    Nausea and Vomiting  
 dizziness Ciprofloxacin  02/18/2011    Other (comments) Joint pian, dizziness, lightheadedness, confusion Pcn [Penicillins]  02/18/2011    Rash Current Immunizations  Never Reviewed No immunizations on file. Not reviewed this visit You Were Diagnosed With   
  
 Codes Comments Low back pain, unspecified back pain laterality, unspecified chronicity, with sciatica presence unspecified    -  Primary ICD-10-CM: M54.5 ICD-9-CM: 724.2 Dysuria     ICD-10-CM: R30.0 ICD-9-CM: 364. 1 Acute cystitis without hematuria     ICD-10-CM: N30.00 ICD-9-CM: 595.0 Sore throat     ICD-10-CM: J02.9 ICD-9-CM: 657 Vitals BP Pulse Temp Resp Height(growth percentile) Weight(growth percentile) 128/85 (BP 1 Location: Right arm, BP Patient Position: Sitting) 76 98.3 °F (36.8 °C) (Oral) 18 5' 5\" (1.651 m) 189 lb (85.7 kg) LMP SpO2 BMI OB Status Smoking Status 08/12/2017 96% 31.45 kg/m2 Having regular periods Never Smoker Vitals History BMI and BSA Data Body Mass Index Body Surface Area  
 31.45 kg/m 2 1.98 m 2 Preferred Pharmacy Pharmacy Name Phone [facility] [place], [address]  Your Updated Medication List  
 This list is accurate as of: 9/21/17  5:18 PM.  Always use your most recent med list.  
  
  
  
  
 acyclovir 5 % ointment Commonly known as:  ZOVIRAX Apply  to affected area every three (3) hours. B COMPLEX 1 tablet Generic drug:  b complex vitamins Take 1 Tab by mouth daily. CLARITIN PO Take  by mouth as needed. magnesium 500 mg Tab Take  by mouth.  
  
 metFORMIN  mg tablet Commonly known as:  GLUCOPHAGE XR  
TAKE 1 TABLET BY MOUTH EVERY DAY FOR 30 DAYS  
  
 predniSONE 5 mg dose pack Commonly known as:  STERAPRED See administration instruction per 5mg dose pack  
  
 triamcinolone 55 mcg nasal inhaler Commonly known as:  NASACORT AQ  
2 Sprays by Both Nostrils route daily. VITAMIN C 500 mg tablet Generic drug:  ascorbic acid (vitamin C) Take  by mouth. We Performed the Following AMB POC RAPID STREP A [77838 CPT(R)] AMB POC URINALYSIS DIP STICK AUTO W/O MICRO [67741 CPT(R)] CULTURE, URINE [id number] CPT(R)] Patient Instructions Urinary Tract Infection in Women: Care Instructions Your Care Instructions A urinary tract infection, or UTI, is a general term for an infection anywhere between the kidneys and the urethra (where urine comes out). Most UTIs are bladder infections. They often cause pain or burning when you urinate. UTIs are caused by bacteria and can be cured with antibiotics. Be sure to complete your treatment so that the infection goes away. Follow-up care is a key part of your treatment and safety. Be sure to make and go to all appointments, and call your doctor if you are having problems. It's also a good idea to know your test results and keep a list of the medicines you take. How can you care for yourself at home? · Take your antibiotics as directed. Do not stop taking them just because you feel better. You need to take the full course of antibiotics. · Drink extra water and other fluids for the next day or two. This may help wash out the bacteria that are causing the infection. (If you have kidney, heart, or liver disease and have to limit fluids, talk with your doctor before you increase your fluid intake.) · Avoid drinks that are carbonated or have caffeine. They can irritate the bladder. · Urinate often. Try to empty your bladder each time. · To relieve pain, take a hot bath or lay a heating pad set on low over your lower belly or genital area. Never go to sleep with a heating pad in place. To prevent UTIs · Drink plenty of water each day. This helps you urinate often, which clears bacteria from your system. (If you have kidney, heart, or liver disease and have to limit fluids, talk with your doctor before you increase your fluid intake.) · Urinate when you need to. · Urinate right after you have sex. · Change sanitary pads often. · Avoid douches, bubble baths, feminine hygiene sprays, and other feminine hygiene products that have deodorants. · After going to the bathroom, wipe from front to back. When should you call for help? Call your doctor now or seek immediate medical care if: · Symptoms such as fever, chills, nausea, or vomiting get worse or appear for the first time. · You have new pain in your back just below your rib cage. This is called flank pain. · There is new blood or pus in your urine. · You have any problems with your antibiotic medicine. Watch closely for changes in your health, and be sure to contact your doctor if: 
· You are not getting better after taking an antibiotic for 2 days. · Your symptoms go away but then come back. Where can you learn more? Go to http://estefani-carl.info/. Enter M788 in the search box to learn more about \"Urinary Tract Infection in Women: Care Instructions. \" Current as of: November 28, 2016 Content Version: 11.3 © 1863-9788 Healthwise, Inc[employer]. Care instructions adapted under license by [employer] (which disclaims liability or warranty for this information). If you have questions about a medical condition or this instruction, always ask your healthcare professional. [address] any warranty or liability for your use of this information. Introducing [facility]! Dear Marla Mitchell: Thank you for requesting a [facility] account. Our records indicate that you already have an active [facility] account. You can access your account anytime at [email or web address] Did you know that you can access your hospital and ER discharge instructions at any time in [facility]? You can also review all of your test results from your hospital stay or ER visit. Additional Information If you have questions, please visit the Frequently Asked Questions section of the [facility] website at [email or web address] Remember, [facility] is NOT to be used for urgent needs. For medical emergencies, dial 911. Now available from your iPhone and Android! Please provide this summary of care documentation to your next provider. Your primary care clinician is listed as Álvaro Dela Cruz. If you have any questions after today's visit, please call [phone].

## 2017-09-21 NOTE — PROGRESS NOTES
Chief Complaint   Patient presents with    Back Pain     x 2 week, 4 on pain scale, pt stated when she stand she feel the pain more     1. Have you been to the ER, urgent care clinic since your last visit? Hospitalized since your last visit? No    2. Have you seen or consulted any other health care providers outside of the [address] [place] [address] since your last visit? Include any pap smears or colon screening.  No

## 2017-09-21 NOTE — PATIENT INSTRUCTIONS
Urinary Tract Infection in Women: Care Instructions  Your Care Instructions    A urinary tract infection, or UTI, is a general term for an infection anywhere between the kidneys and the urethra (where urine comes out). Most UTIs are bladder infections. They often cause pain or burning when you urinate. UTIs are caused by bacteria and can be cured with antibiotics. Be sure to complete your treatment so that the infection goes away. Follow-up care is a key part of your treatment and safety. Be sure to make and go to all appointments, and call your doctor if you are having problems. It's also a good idea to know your test results and keep a list of the medicines you take. How can you care for yourself at home? · Take your antibiotics as directed. Do not stop taking them just because you feel better. You need to take the full course of antibiotics. · Drink extra water and other fluids for the next day or two. This may help wash out the bacteria that are causing the infection. (If you have kidney, heart, or liver disease and have to limit fluids, talk with your doctor before you increase your fluid intake.)  · Avoid drinks that are carbonated or have caffeine. They can irritate the bladder. · Urinate often. Try to empty your bladder each time. · To relieve pain, take a hot bath or lay a heating pad set on low over your lower belly or genital area. Never go to sleep with a heating pad in place. To prevent UTIs  · Drink plenty of water each day. This helps you urinate often, which clears bacteria from your system. (If you have kidney, heart, or liver disease and have to limit fluids, talk with your doctor before you increase your fluid intake.)  · Urinate when you need to. · Urinate right after you have sex. · Change sanitary pads often. · Avoid douches, bubble baths, feminine hygiene sprays, and other feminine hygiene products that have deodorants.   · After going to the bathroom, wipe from front to back.  When should you call for help? Call your doctor now or seek immediate medical care if:  · Symptoms such as fever, chills, nausea, or vomiting get worse or appear for the first time. · You have new pain in your back just below your rib cage. This is called flank pain. · There is new blood or pus in your urine. · You have any problems with your antibiotic medicine. Watch closely for changes in your health, and be sure to contact your doctor if:  · You are not getting better after taking an antibiotic for 2 days. · Your symptoms go away but then come back. Where can you learn more? Go to http://estefani-carl.info/. Enter K964 in the search box to learn more about \"Urinary Tract Infection in Women: Care Instructions. \"  Current as of: November 28, 2016  Content Version: 11.3  © 8293-3356 [employer]. Care instructions adapted under license by "[employer] (which disclaims liability or warranty for this information). If you have questions about a medical condition or this instruction, always ask your healthcare professional. [address] any warranty or liability for your use of this information.

## 2017-09-21 NOTE — PROGRESS NOTES
CC:  Chief Complaint   Patient presents with    Back Pain     x 2 week, 4 on pain scale, pt stated when she stand she feel the pain more     Subjective:     Sudarshan Joiner is a 47 y.o. female who complains of dysuria, frequency, urgency, and back pain for 2 weeks. Patient denies flank pain, vomiting, fever, unusual vaginal discharge. Patient does not have a history of recurrent UTI. Patient does not have a history of pyelonephritis. Patient Active Problem List    Diagnosis Date Noted    Prediabetes 2014    Hypercholesterolemia 2014    Polycystic ovarian syndrome 2011    Goiter 2011     Current Outpatient Prescriptions   Medication Sig Dispense Refill    ascorbic acid, vitamin C, (VITAMIN C) 500 mg tablet Take  by mouth.  b complex vitamins (B COMPLEX 1) tablet Take 1 Tab by mouth daily.  metFORMIN ER (GLUCOPHAGE XR) 500 mg tablet TAKE 1 TABLET BY MOUTH EVERY DAY FOR 30 DAYS  6    predniSONE (STERAPRED) 5 mg dose pack See administration instruction per 5mg dose pack 21 Tab 0    triamcinolone (NASACORT AQ) 55 mcg nasal inhaler 2 Sprays by Both Nostrils route daily. 16.5 Bottle 2    LORATADINE (CLARITIN PO) Take  by mouth as needed.  acyclovir (ZOVIRAX) 5 % ointment Apply  to affected area every three (3) hours. 2 g 0    magnesium 500 mg Tab Take  by mouth.          Allergies   Allergen Reactions    Bactrim [Sulfamethoprim] Diarrhea and Nausea Only     severe nausea/gas pain/    Biaxin [Clarithromycin] Nausea and Vomiting     dizziness    Ciprofloxacin Other (comments)     Joint pian, dizziness, lightheadedness, confusion    Pcn [Penicillins] Rash     Past Medical History:   Diagnosis Date    GERD (gastroesophageal reflux disease) 2011    Goiter 2011    Hypercholesterolemia 2014    Polycystic ovarian syndrome 2011     Past Surgical History:   Procedure Laterality Date    ABDOMEN SURGERY PROC UNLISTED      DELIVERY   47    HX GYN c section     Family History   Problem Relation Age of Onset    Diabetes Mother     Alzheimer Mother     Cancer Mother      endometrial cancer    Elevated Lipids Father     Cancer Father      prostate cancer    Diabetes Sister     Cancer Sister      endometrial cancer    Heart Disease Maternal Grandfather     Breast Cancer Paternal Aunt     Breast Cancer Cousin      Social History   Substance Use Topics    Smoking status: Never Smoker    Smokeless tobacco: Never Used    Alcohol use 0.0 oz/week     0 Standard drinks or equivalent per week      Comment: occ wine        Review of Systems  Pertinent items are noted in HPI. Objective:     Visit Vitals    /85 (BP 1 Location: Right arm, BP Patient Position: Sitting)    Pulse 76    Temp 98.3 °F (36.8 °C) (Oral)    Resp 18    Ht 5' 5\" (1.651 m)    Wt 189 lb (85.7 kg)    LMP 08/12/2017    SpO2 96%    BMI 31.45 kg/m2     General:  alert, cooperative, no distress   Abdomen: soft, nontender, nondistended, no masses or organomegaly.     Back:  CVA tenderness absent   :  defer exam     Laboratory:   Results for orders placed or performed in visit on 09/21/17   AMB POC URINALYSIS DIP STICK AUTO W/O MICRO   Result Value Ref Range    Color (UA POC)      Clarity (UA POC)      Glucose (UA POC) Negative Negative    Bilirubin (UA POC) Negative Negative    Ketones (UA POC) Negative Negative    Specific gravity (UA POC) 1.025 1.001 - 1.035    Blood (UA POC) Negative Negative    pH (UA POC) 6.5 4.6 - 8.0    Protein (UA POC) Negative Negative mg/dL    Urobilinogen (UA POC) 1 mg/dL 0.2 - 1    Nitrites (UA POC) Negative Negative    Leukocyte esterase (UA POC) Trace Negative   AMB POC RAPID STREP A   Result Value Ref Range    VALID INTERNAL CONTROL POC Yes     Group A Strep Ag Negative Negative         Assessment/Plan:       ICD-10-CM ICD-9-CM    1. Low back pain, unspecified back pain laterality, unspecified chronicity, with sciatica presence unspecified M54.5 724.2 AMB POC URINALYSIS DIP STICK AUTO W/O MICRO   2. Dysuria R30.0 788. 1 CULTURE, URINE   3. Acute cystitis without hematuria N30.00 595.0 nitrofurantoin (MACRODANTIN) 100 mg capsule   4. Sore throat J02.9 462 AMB POC RAPID STREP A     52F with symptoms concerning for possible UTI. Will treat empirically with macrodantin pending her urine culture. Will advise when the urine culture returns. I have discussed the diagnosis with the patient and the intended treatment plan as seen in the above orders. The patient has received an after-visit summary and questions were answered concerning future plans. Asked to return should symptoms worsen or not improve with treatment. Any pending labs and studies will be relayed to patient when they become available. Pt verbalizes understanding of plan of care and denies further questions or concerns at this time. Follow-up Disposition:  Return if symptoms worsen or fail to improve.

## 2018-05-02 ENCOUNTER — TELEPHONE (OUTPATIENT)
Dept: FAMILY MEDICINE CLINIC | Age: 53
End: 2018-05-02

## 2018-05-02 NOTE — TELEPHONE ENCOUNTER
Patient is calling and asking If Dr. Louisa Baptiste will send over script for flexeril for her . She thought that she had a uti but patient stated that when she went to [facility] that they checked her urine and it is not UTI and thinks it is coming from the nerve pain in her back. I stated to her because we had not seen her since 9/21/2017 that Dr. Louisa Baptiste would require an appt. She was insistent that message be put in. She stated that she had been given this before and in looking at chart that goes back to 2012. Please call to advise.  [phone]

## 2018-05-03 RX ORDER — CYCLOBENZAPRINE HCL 10 MG
10 TABLET ORAL
Qty: 10 TAB | Refills: 0 | Status: SHIPPED | OUTPATIENT
Start: 2018-05-03 | End: 2018-10-24 | Stop reason: SDUPTHER

## 2018-05-03 NOTE — TELEPHONE ENCOUNTER
Pt was advised. She reports that since leaving the message she has seen a chiropractor and was advised he thinks pain is related to a pinched nerve, which the flexeril will not be affective at treating. Chiropractor thinks she should use a steroid pack. Pt was then advised, flexeril is what Dr. Zita Cannon feels comfortable prescribing and if pain does not resolve with that medication the pt will need to be seen. Pt verbalized understanding.

## 2018-10-24 ENCOUNTER — OFFICE VISIT (OUTPATIENT)
Dept: FAMILY MEDICINE CLINIC | Age: 53
End: 2018-10-24

## 2018-10-24 VITALS
BODY MASS INDEX: 32.86 KG/M2 | SYSTOLIC BLOOD PRESSURE: 112 MMHG | TEMPERATURE: 97.9 F | WEIGHT: 197.2 LBS | RESPIRATION RATE: 20 BRPM | DIASTOLIC BLOOD PRESSURE: 78 MMHG | HEIGHT: 65 IN | HEART RATE: 83 BPM | OXYGEN SATURATION: 98 %

## 2018-10-24 DIAGNOSIS — E04.9 GOITER: ICD-10-CM

## 2018-10-24 DIAGNOSIS — M54.5 MIDLINE LOW BACK PAIN, UNSPECIFIED CHRONICITY, WITH SCIATICA PRESENCE UNSPECIFIED: ICD-10-CM

## 2018-10-24 DIAGNOSIS — N39.0 URINARY TRACT INFECTION WITHOUT HEMATURIA, SITE UNSPECIFIED: ICD-10-CM

## 2018-10-24 DIAGNOSIS — E28.2 POLYCYSTIC OVARIAN SYNDROME: ICD-10-CM

## 2018-10-24 DIAGNOSIS — R73.03 PREDIABETES: ICD-10-CM

## 2018-10-24 DIAGNOSIS — N30.10 INTERSTITIAL CYSTITIS: Primary | ICD-10-CM

## 2018-10-24 DIAGNOSIS — E78.00 HYPERCHOLESTEROLEMIA: ICD-10-CM

## 2018-10-24 DIAGNOSIS — Z79.899 ENCOUNTER FOR LONG-TERM CURRENT USE OF MEDICATION: ICD-10-CM

## 2018-10-24 LAB
BILIRUB UR QL STRIP: NEGATIVE
GLUCOSE UR-MCNC: NEGATIVE MG/DL
KETONES P FAST UR STRIP-MCNC: NEGATIVE MG/DL
PH UR STRIP: 7.5 [PH] (ref 4.6–8)
PROT UR QL STRIP: NEGATIVE
SP GR UR STRIP: 1.02 (ref 1–1.03)
UA UROBILINOGEN AMB POC: NORMAL (ref 0.2–1)
URINALYSIS CLARITY POC: CLEAR
URINALYSIS COLOR POC: YELLOW
URINE BLOOD POC: NEGATIVE
URINE LEUKOCYTES POC: NEGATIVE
URINE NITRITES POC: NEGATIVE

## 2018-10-24 RX ORDER — CYCLOBENZAPRINE HCL 10 MG
10 TABLET ORAL
Qty: 30 TAB | Refills: 0 | Status: SHIPPED | OUTPATIENT
Start: 2018-10-24 | End: 2018-11-26 | Stop reason: ALTCHOICE

## 2018-10-24 RX ORDER — CHOLECALCIFEROL TAB 125 MCG (5000 UNIT) 125 MCG
10000 TAB ORAL DAILY
COMMUNITY
End: 2021-08-18

## 2018-10-24 NOTE — PROGRESS NOTES
Identified pt with two pt identifiers(name and ). Chief Complaint   Patient presents with    Bladder Infection     last couple days - low back pain and pelvic        Health Maintenance Due   Topic    Shingrix Vaccine Age 50> (1 of 2)    FOBT Q 1 YEAR AGE 50-75     Influenza Age 5 to Adult     BREAST CANCER SCRN MAMMOGRAM    No Flu shot     Wt Readings from Last 3 Encounters:   10/24/18 197 lb 3.2 oz (89.4 kg)   17 189 lb (85.7 kg)   17 190 lb 6.4 oz (86.4 kg)     Temp Readings from Last 3 Encounters:   10/24/18 97.9 °F (36.6 °C) (Oral)   17 98.3 °F (36.8 °C) (Oral)   17 98.2 °F (36.8 °C) (Oral)     BP Readings from Last 3 Encounters:   10/24/18 112/78   17 128/85   17 123/75     Pulse Readings from Last 3 Encounters:   10/24/18 83   17 76   17 93         Learning Assessment:  :     Learning Assessment 10/24/2018 2014   PRIMARY LEARNER Patient Patient   HIGHEST LEVEL OF EDUCATION - PRIMARY LEARNER  > 4 YEARS OF COLLEGE > 4 YEARS OF COLLEGE   BARRIERS PRIMARY LEARNER NONE NONE   CO-LEARNER CAREGIVER - No   PRIMARY LANGUAGE ENGLISH ENGLISH   LEARNER PREFERENCE PRIMARY DEMONSTRATION DEMONSTRATION     LISTENING READING     READING -   ANSWERED BY self self   RELATIONSHIP SELF SELF       Depression Screening:  :     PHQ over the last two weeks 10/24/2018   Little interest or pleasure in doing things Not at all   Feeling down, depressed, irritable, or hopeless Not at all   Total Score PHQ 2 0       Fall Risk Assessment:  :     Fall Risk Assessment, last 12 mths 3/31/2017   Able to walk? Yes   Fall in past 12 months? No       Abuse Screening:  :     Abuse Screening Questionnaire 10/24/2018 3/31/2017 2014   Do you ever feel afraid of your partner? N N N   Are you in a relationship with someone who physically or mentally threatens you? N N N   Is it safe for you to go home?  Y Y Y       Coordination of Care Questionnaire:  :     1) Have you been to an emergency room, urgent care clinic since your last visit? no   Hospitalized since your last visit? no             2) Have you seen or consulted any other health care providers outside of [address] [place] [address] since your last visit? yes  GYN Dr Angie Kaplan 5/18 (Include any pap smears or colon screenings in this section.)    3) Do you have an Advance Directive on file? No she has her own   Are you interested in receiving information about Advance Directives? no    Reviewed record in preparation for visit and have obtained necessary documentation. Medication reconciliation up to date and corrected with patient at this time.      Results for orders placed or performed in visit on 10/24/18   AMB POC URINALYSIS DIP STICK MANUAL W/O MICRO   Result Value Ref Range    Color (UA POC) Yellow     Clarity (UA POC) Clear     Glucose (UA POC) Negative Negative    Bilirubin (UA POC) Negative Negative    Ketones (UA POC) Negative Negative    Specific gravity (UA POC) 1.020 1.001 - 1.035    Blood (UA POC) Negative Negative    pH (UA POC) 7.5 4.6 - 8.0    Protein (UA POC) Negative Negative    Urobilinogen (UA POC) 0.2 mg/dL 0.2 - 1    Nitrites (UA POC) Negative Negative    Leukocyte esterase (UA POC) Negative Negative

## 2018-10-24 NOTE — PROGRESS NOTES
HISTORY OF PRESENT ILLNESS  Amado Krishna is a 47 y.o. female. HPI  C/O pelvic discomfort off and on x months. She has been checked by [facility]. This week she had low back pain and wanted to check for UTI. Under some stress. She had been dx with interstitial cystitis years ago. She has seen Raegan Buck in past for back pain. Pt also has PCOS and thyroid goiter. She last saw ENDO over a year ago. We don't have records from thyroid specialist who did biopsy of nodule. Nor do we have [place] [facility] [place]. She tried Metformin per his orders but did not tolerate it. Weight is up. She lost her mother last DEC. Review of Systems   Gastrointestinal: Positive for abdominal pain. Musculoskeletal: Positive for back pain. Endo/Heme/Allergies:        Irregular menses   All other systems reviewed and are negative. Past Medical History:   Diagnosis Date    GERD (gastroesophageal reflux disease) 2011    Goiter 2011    Hypercholesterolemia 2014    Polycystic ovarian syndrome 2011     Past Surgical History:   Procedure Laterality Date    ABDOMEN SURGERY PROC UNLISTED      DELIVERY   47    HX GYN      c section     Current Outpatient Medications   Medication Sig Dispense Refill    cholecalciferol, VITAMIN D3, (VITAMIN D3) 5,000 unit tab tablet Take 10,000 Units by mouth daily.  cyclobenzaprine (FLEXERIL) 10 mg tablet Take 1 Tab by mouth nightly. 30 Tab 0    ascorbic acid, vitamin C, (VITAMIN C) 500 mg tablet Take  by mouth.  b complex vitamins (B COMPLEX 1) tablet Take 1 Tab by mouth daily.  magnesium 500 mg Tab Take  by mouth.  triamcinolone (NASACORT AQ) 55 mcg nasal inhaler 2 Sprays by Both Nostrils route daily. 16.5 Bottle 2    LORATADINE (CLARITIN PO) Take  by mouth as needed.  acyclovir (ZOVIRAX) 5 % ointment Apply  to affected area every three (3) hours.  2 g 0     Social History     Socioeconomic History    Marital status:  Spouse name: Not on file    Number of children: Not on file    Years of education: Not on file    Highest education level: Not on file   Social Needs    Financial resource strain: Not on file    Food insecurity - worry: Not on file    Food insecurity - inability: Not on file    Transportation needs - medical: Not on file   [employer] needs - non-medical: Not on file   Occupational History    Not on file   Tobacco Use    Smoking status: Never Smoker    Smokeless tobacco: Never Used   Substance and Sexual Activity    Alcohol use: Yes     Alcohol/week: 0.0 oz     Comment: occ wine    Drug use: No    Sexual activity: Yes     Partners: Male     Birth control/protection: None   Other Topics Concern    Not on file   Social History Narrative    Not on file     Family History   Problem Relation Age of Onset    Diabetes Mother     Alzheimer Mother     Cancer Mother         endometrial cancer    Elevated Lipids Father     Cancer Father         prostate cancer    Diabetes Sister     Cancer Sister         endometrial cancer    Heart Disease Maternal Grandfather     Breast Cancer Paternal Aunt     Breast Cancer Cousin        Visit Vitals  /78 (BP 1 Location: Left arm, BP Patient Position: Sitting) Comment: manual   Pulse 83   Temp 97.9 °F (36.6 °C) (Oral)   Resp 20   Ht 5' 5\" (1.651 m)   Wt 197 lb 3.2 oz (89.4 kg)   LMP 10/24/2017   SpO2 98%   BMI 32.82 kg/m²       Physical Exam   Constitutional: She is oriented to person, place, and time. She appears well-developed and well-nourished. Eyes: Conjunctivae are normal.   Neck: Trachea normal and normal range of motion. Thyromegaly present. Cardiovascular: Normal rate, regular rhythm and normal heart sounds. Pulmonary/Chest: Effort normal and breath sounds normal.   Neurological: She is alert and oriented to person, place, and time. Skin: Rash: .vs.   Vitals reviewed. ASSESSMENT and PLAN    ICD-10-CM ICD-9-CM    1.  Interstitial cystitis N30.10 595.1 REFERRAL TO FEMALE PELVIC MEDICINE AND RECONSTRUCTIVE SURGERY   2. Urinary tract infection without hematuria, site unspecified N39.0 599.0 AMB POC URINALYSIS DIP STICK MANUAL W/O MICRO      CULTURE, URINE   3. Goiter E04.9 240.9 TSH 3RD GENERATION      T4, FREE   4. Polycystic ovarian syndrome E28.2 256.4 HEMOGLOBIN A1C WITH EAG   5. Prediabetes R73.03 790.29    6. Hypercholesterolemia E78.00 272.0 LIPID PANEL   7. Encounter for long-term current use of medication Z79.899 V58.69 CBC WITH AUTOMATED DIFF      METABOLIC PANEL, COMPREHENSIVE   8. Midline low back pain, unspecified chronicity, with sciatica presence unspecified M54.5 724.2 cyclobenzaprine (FLEXERIL) 10 mg tablet     Order labs to take to [employer].  Refer to [facility] for back treatment. Refill Flexeril. Refer back to ENDO for thyroid. Also refer to UROLOGY. Work on weight reduction by diet and exercise. Discussed the patient's BMI with her. The BMI follow up plan is as follows:     dietary management education, guidance, and counseling  encourage exercise  monitor weight  prescribed dietary intake    An After Visit Summary was printed and given to the patient.

## 2018-10-24 NOTE — PATIENT INSTRUCTIONS
Body Mass Index: Care Instructions Your Care Instructions Body mass index (BMI) can help you see if your weight is raising your risk for health problems. It uses a formula to compare how much you weigh with how tall you are. · A BMI lower than 18.5 is considered underweight. · A BMI between 18.5 and 24.9 is considered healthy. · A BMI between 25 and 29.9 is considered overweight. A BMI of 30 or higher is considered obese. If your BMI is in the normal range, it means that you have a lower risk for weight-related health problems. If your BMI is in the overweight or obese range, you may be at increased risk for weight-related health problems, such as high blood pressure, heart disease, stroke, arthritis or joint pain, and diabetes. If your BMI is in the underweight range, you may be at increased risk for health problems such as fatigue, lower protection (immunity) against illness, muscle loss, bone loss, hair loss, and hormone problems. BMI is just one measure of your risk for weight-related health problems. You may be at higher risk for health problems if you are not active, you eat an unhealthy diet, or you drink too much alcohol or use tobacco products. Follow-up care is a key part of your treatment and safety. Be sure to make and go to all appointments, and call your doctor if you are having problems. It's also a good idea to know your test results and keep a list of the medicines you take. How can you care for yourself at home? · Practice healthy eating habits. This includes eating plenty of fruits, vegetables, whole grains, lean protein, and low-fat dairy. · If your doctor recommends it, get more exercise. Walking is a good choice. Bit by bit, increase the amount you walk every day. Try for at least 30 minutes on most days of the week. · Do not smoke. Smoking can increase your risk for health problems.  If you need help quitting, talk to your doctor about stop-smoking programs and medicines. These can increase your chances of quitting for good. · Limit alcohol to 2 drinks a day for men and 1 drink a day for women. Too much alcohol can cause health problems. If you have a BMI higher than 25 · Your doctor may do other tests to check your risk for weight-related health problems. This may include measuring the distance around your waist. A waist measurement of more than 40 inches in men or 35 inches in women can increase the risk of weight-related health problems. · Talk with your doctor about steps you can take to stay healthy or improve your health. You may need to make lifestyle changes to lose weight and stay healthy, such as changing your diet and getting regular exercise. If you have a BMI lower than 18.5 · Your doctor may do other tests to check your risk for health problems. · Talk with your doctor about steps you can take to stay healthy or improve your health. You may need to make lifestyle changes to gain or maintain weight and stay healthy, such as getting more healthy foods in your diet and doing exercises to build muscle. Where can you learn more? Go to http://estefani-carl.info/. Enter S176 in the search box to learn more about \"Body Mass Index: Care Instructions. \" Current as of: October 13, 2016 Content Version: 11.4 © 7483-8160 Healthwise, [employer]. Care instructions adapted under license by [employer] (which disclaims liability or warranty for this information). If you have questions about a medical condition or this instruction, always ask your healthcare professional. [address] any warranty or liability for your use of this information.

## 2018-10-26 LAB
BACTERIA UR CULT: NORMAL
BACTERIA UR CULT: NORMAL

## 2018-11-26 ENCOUNTER — OFFICE VISIT (OUTPATIENT)
Dept: FAMILY MEDICINE CLINIC | Age: 53
End: 2018-11-26

## 2018-11-26 VITALS
HEART RATE: 83 BPM | WEIGHT: 196 LBS | RESPIRATION RATE: 20 BRPM | SYSTOLIC BLOOD PRESSURE: 112 MMHG | HEIGHT: 65 IN | OXYGEN SATURATION: 97 % | DIASTOLIC BLOOD PRESSURE: 80 MMHG | BODY MASS INDEX: 32.65 KG/M2 | TEMPERATURE: 97.8 F

## 2018-11-26 DIAGNOSIS — M79.89 FOOT SWELLING: ICD-10-CM

## 2018-11-26 DIAGNOSIS — J32.9 SINUSITIS, UNSPECIFIED CHRONICITY, UNSPECIFIED LOCATION: ICD-10-CM

## 2018-11-26 DIAGNOSIS — Z91.09 ENVIRONMENTAL ALLERGIES: Primary | ICD-10-CM

## 2018-11-26 RX ORDER — LEVOCETIRIZINE DIHYDROCHLORIDE 5 MG/1
5 TABLET, FILM COATED ORAL DAILY
Qty: 90 TAB | Refills: 3 | Status: SHIPPED | OUTPATIENT
Start: 2018-11-26 | End: 2019-12-19

## 2018-11-26 RX ORDER — AZITHROMYCIN 250 MG/1
TABLET, FILM COATED ORAL
Qty: 6 TAB | Refills: 0 | Status: SHIPPED | OUTPATIENT
Start: 2018-11-26 | End: 2018-12-01

## 2018-11-26 NOTE — PROGRESS NOTES
Identified pt with two pt identifiers(name and ). Chief Complaint   Patient presents with    Sinus Infection     had URI last weekend then this friday - drainage and achy, pressure         Health Maintenance Due   Topic    Shingrix Vaccine Age 50> (1 of 2)    FOBT Q 1 YEAR AGE 50-75     BREAST CANCER SCRN MAMMOGRAM        Wt Readings from Last 3 Encounters:   18 196 lb (88.9 kg)   10/24/18 197 lb 3.2 oz (89.4 kg)   17 189 lb (85.7 kg)     Temp Readings from Last 3 Encounters:   18 97.8 °F (36.6 °C) (Oral)   10/24/18 97.9 °F (36.6 °C) (Oral)   17 98.3 °F (36.8 °C) (Oral)     BP Readings from Last 3 Encounters:   18 112/80   10/24/18 112/78   17 128/85     Pulse Readings from Last 3 Encounters:   18 83   10/24/18 83   17 76         Learning Assessment:  :     Learning Assessment 10/24/2018 2014   PRIMARY LEARNER Patient Patient   HIGHEST LEVEL OF EDUCATION - PRIMARY LEARNER  > 4 YEARS OF COLLEGE > 4 YEARS OF COLLEGE   BARRIERS PRIMARY LEARNER NONE NONE   CO-LEARNER CAREGIVER - No   PRIMARY LANGUAGE ENGLISH ENGLISH   LEARNER PREFERENCE PRIMARY DEMONSTRATION DEMONSTRATION     LISTENING READING     READING -   ANSWERED BY self self   RELATIONSHIP SELF SELF       Depression Screening:  :     PHQ over the last two weeks 10/24/2018   Little interest or pleasure in doing things Not at all   Feeling down, depressed, irritable, or hopeless Not at all   Total Score PHQ 2 0       Fall Risk Assessment:  :     Fall Risk Assessment, last 12 mths 3/31/2017   Able to walk? Yes   Fall in past 12 months? No       Abuse Screening:  :     Abuse Screening Questionnaire 10/24/2018 3/31/2017 2014   Do you ever feel afraid of your partner? N N N   Are you in a relationship with someone who physically or mentally threatens you? N N N   Is it safe for you to go home?  Luke Gilliland       Coordination of Care Questionnaire:  :     1) Have you been to an emergency room, urgent care clinic since your last visit? no   Hospitalized since your last visit? no             2) Have you seen or consulted any other health care providers outside of [address] [place] [address] since your last visit? no  (Include any pap smears or colon screenings in this section.)    3) Do you have an Advance Directive on file? yes  Are you interested in receiving information about Advance Directives? no    Reviewed record in preparation for visit and have obtained necessary documentation. Medication reconciliation up to date and corrected with patient at this time.

## 2018-11-26 NOTE — PATIENT INSTRUCTIONS
Saline Nasal Washes: Care Instructions Your Care Instructions Saline nasal washes help keep the nasal passages open by washing out thick or dried mucus. This simple remedy can help relieve symptoms of allergies, sinusitis, and colds. It also can make the nose feel more comfortable by keeping the mucous membranes moist. You may notice a little burning sensation in your nose the first few times you use the solution, but this usually gets better in a few days. Follow-up care is a key part of your treatment and safety. Be sure to make and go to all appointments, and call your doctor if you are having problems. It's also a good idea to know your test results and keep a list of the medicines you take. How can you care for yourself at home? · You can buy premixed saline solution in a squeeze bottle or other sinus rinse products at a drugstore. Read and follow the instructions on the label. · You also can make your own saline solution by adding 1 teaspoon of salt and 1 teaspoon of baking soda to 2 cups of distilled water. · If you use a homemade solution, pour a small amount into a clean bowl. Using a rubber bulb syringe, squeeze the syringe and place the tip in the salt water. Pull a small amount of the salt water into the syringe by relaxing your hand. · Sit down with your head tilted slightly back. Do not lie down. Put the tip of the bulb syringe or the squeeze bottle a little way into one of your nostrils. Gently drip or squirt a few drops into the nostril. Repeat with the other nostril. Some sneezing and gagging are normal at first. 
· Gently blow your nose. · Wipe the syringe or bottle tip clean after each use. · Repeat this 2 or 3 times a day. · Use nasal washes gently if you have nosebleeds often. When should you call for help? Watch closely for changes in your health, and be sure to contact your doctor if: 
  · You often get nosebleeds.  
  · You have problems doing the nasal washes. Where can you learn more? Go to http://estefani-carl.info/. Enter [id number] in the search box to learn more about \"Saline Nasal Washes: Care Instructions. \" Current as of: March 28, 2018 Content Version: 11.8 © 5738-6695 Healthwise, [employer]. Care instructions adapted under license by [employer] (which disclaims liability or warranty for this information). If you have questions about a medical condition or this instruction, always ask your healthcare professional. [address] any warranty or liability for your use of this information.

## 2018-11-26 NOTE — PROGRESS NOTES
Subjective:   Amado Krishna is a 47 y.o. female who complains of congestion, post nasal drip, headache, bilateral sinus pain, yellow nasal discharge and hoarseness for 7 days, gradually worsening since that time. She denies a history of fevers, shortness of breath and wheezing. Evaluation to date: none. Treatment to date: antihistamines. Patient does not smoke cigarettes. Relevant PMH: No pertinent additional PMH. Patient Active Problem List    Diagnosis Date Noted    Prediabetes 07/30/2014    Hypercholesterolemia 07/30/2014    Polycystic ovarian syndrome 02/18/2011    Goiter 02/18/2011     Current Outpatient Medications   Medication Sig Dispense Refill    cholecalciferol, VITAMIN D3, (VITAMIN D3) 5,000 unit tab tablet Take 10,000 Units by mouth daily.  ascorbic acid, vitamin C, (VITAMIN C) 500 mg tablet Take  by mouth.  b complex vitamins (B COMPLEX 1) tablet Take 1 Tab by mouth daily.  LORATADINE (CLARITIN PO) Take  by mouth as needed.  magnesium 500 mg Tab Take  by mouth.  cyclobenzaprine (FLEXERIL) 10 mg tablet Take 1 Tab by mouth nightly. 30 Tab 0    triamcinolone (NASACORT AQ) 55 mcg nasal inhaler 2 Sprays by Both Nostrils route daily. 16.5 Bottle 2    acyclovir (ZOVIRAX) 5 % ointment Apply  to affected area every three (3) hours. 2 g 0     Allergies   Allergen Reactions    Bactrim [Sulfamethoprim] Diarrhea and Nausea Only     severe nausea/gas pain/    Biaxin [Clarithromycin] Nausea and Vomiting     dizziness    Ciprofloxacin Other (comments)     Joint pian, dizziness, lightheadedness, confusion    Metformin Other (comments)     Hair loss    Pcn [Penicillins] Rash        Review of Systems  Pertinent items are noted in HPI. Also C/O ankle swelling. Non tender. No trauma.     Objective:     Visit Vitals  /80 (BP 1 Location: Left arm, BP Patient Position: Sitting) Comment: manual   Pulse 83   Temp 97.8 °F (36.6 °C) (Oral)   Resp 20   Ht 5' 5\" (1.651 m)   Wt 196 lb (88.9 kg)   SpO2 97%   BMI 32.62 kg/m²     General:  alert, cooperative, no distress   Eyes: negative   Ears: normal TM's and external ear canals AU   Sinuses: tenderness over bilateral maxillary and frontal   Mouth:  Lips, mucosa, and tongue normal. Teeth and gums normal   Neck: supple, symmetrical, trachea midline and mild anterior cervical adenopathy. Heart: S1 and S2 normal, no murmurs noted. Lungs: clear to auscultation bilaterally   Abdomen:         Assessment/Plan:         ICD-10-CM ICD-9-CM    1. Environmental allergies Z91.09 V15.09 levocetirizine (XYZAL) 5 mg tablet   2. Sinusitis, unspecified chronicity, unspecified location J32.9 473.9 azithromycin (ZITHROMAX) 250 mg tablet   3. Foot swelling M79.89 729.81 REFERRAL TO PODIATRY   .

## 2019-06-25 ENCOUNTER — TELEPHONE (OUTPATIENT)
Dept: FAMILY MEDICINE CLINIC | Age: 54
End: 2019-06-25

## 2019-06-25 NOTE — TELEPHONE ENCOUNTER
The pt is calling stating that she \"Knows\" she has a sinus infection but refuses to make an appointment. States that Smit Ovens has sent in a prescription before without seeing her. I told her that I would get a message back but that typically we do not do this, and that she must be evaluated to be treated. An appointment would be needed, correct? She was very adamant that she didnt want to make an appt because she is making one next week to do labs and doesn't want to be charged twice. I told her it was two different things and unfortunately that is two visits.

## 2019-06-25 NOTE — TELEPHONE ENCOUNTER
Yes, she needs an appointment. If she cannot get here, she will need to go to an urgent care clinic.

## 2019-10-23 ENCOUNTER — OFFICE VISIT (OUTPATIENT)
Dept: FAMILY MEDICINE CLINIC | Age: 54
End: 2019-10-23

## 2019-10-23 VITALS
DIASTOLIC BLOOD PRESSURE: 82 MMHG | SYSTOLIC BLOOD PRESSURE: 120 MMHG | HEIGHT: 65 IN | OXYGEN SATURATION: 98 % | HEART RATE: 81 BPM | BODY MASS INDEX: 31.65 KG/M2 | TEMPERATURE: 97.8 F | RESPIRATION RATE: 20 BRPM | WEIGHT: 190 LBS

## 2019-10-23 DIAGNOSIS — T75.3XXA MOTION SICKNESS, INITIAL ENCOUNTER: ICD-10-CM

## 2019-10-23 DIAGNOSIS — H10.9 CONJUNCTIVITIS OF BOTH EYES, UNSPECIFIED CONJUNCTIVITIS TYPE: ICD-10-CM

## 2019-10-23 DIAGNOSIS — J34.89 SINUS PRESSURE: Primary | ICD-10-CM

## 2019-10-23 RX ORDER — POLYMYXIN B SULFATE AND TRIMETHOPRIM 1; 10000 MG/ML; [USP'U]/ML
1 SOLUTION OPHTHALMIC 2 TIMES DAILY
Qty: 1 BOTTLE | Refills: 0 | Status: SHIPPED | OUTPATIENT
Start: 2019-10-23 | End: 2019-11-02

## 2019-10-23 RX ORDER — AZITHROMYCIN 250 MG/1
TABLET, FILM COATED ORAL
Qty: 6 TAB | Refills: 0 | Status: SHIPPED | OUTPATIENT
Start: 2019-10-23 | End: 2019-10-28

## 2019-10-23 RX ORDER — ALPRAZOLAM 0.5 MG/1
0.5 TABLET ORAL
Qty: 14 TAB | Refills: 0 | Status: SHIPPED | OUTPATIENT
Start: 2019-10-23 | End: 2019-11-06

## 2019-10-23 NOTE — PROGRESS NOTES
Patient presents for complaints of sinus pressure, nasal congestion, post nasal drip, headaches in forehead area  And redness and drainage in left eye. Sinus pressure seems to be worse on left side. Planning to get on the plane this week and fears that possible sinus infection will get worse. Patient declined flu shot. \"I never get them. \"

## 2019-10-23 NOTE — PROGRESS NOTES
Chief Complaint:  Chief Complaint   Patient presents with    Nasal Congestion     Complains of sinus pressure and nasal congestion, post nasal drip, headache in forehead area, left eye redness and drainage. History of Present Illness:  Patient complains of achiness, congestion, facial pain, headache described as sinus pressure, lightheadedness and nasal congestion. Symptoms include facial pain with chills. Onset of symptoms was several days ago, unchanged since that time. She is drinking plenty of fluids. .  Past history is significant for recurrent sinusitis. Patient is non-smoker    She will be traveling tomorrow on a 17-hour trip to [place] and is worried about congestion and somewhat anxious because it is a 17-hour flight out of [place]. Reviewed PmHx, RxHx, FmHx, SocHx, AllgHx and updated and dated in the chart. Patient Active Problem List    Diagnosis    Prediabetes    Hypercholesterolemia    Polycystic ovarian syndrome    Goiter     Review of Systems - negative except as listed above in the HPI    Objective:     Vitals:    10/23/19 1142   BP: (!) 120/100   Pulse: 81   Resp: 20   Temp: 97.8 °F (36.6 °C)   TempSrc: Oral   SpO2: 98%   Weight: 190 lb (86.2 kg)   Height: 5' 5\" (1.651 m)     Physical Examination:   General appearance - alert, well appearing, and in no distress and overweight  Mental status - alert, oriented to person, place, and time  Nose - mucosal congestion, mucosal erythema and sinus tenderness noted maxillary and frontal L>>R  Chest - clear to auscultation, no wheezes, rales or rhonchi, symmetric air entry  Heart - normal rate, regular rhythm, normal S1, S2, no murmurs, rubs, clicks or gallops    Assessment/ Plan:   Diagnoses and all orders for this visit:    1. Sinus pressure  -     azithromycin (ZITHROMAX) 250 mg tablet; Take 2 tablets today, then take 1 tablet daily    2.  Conjunctivitis of both eyes, unspecified conjunctivitis type  -     trimethoprim-polymyxin b (POLYTRIM) ophthalmic solution; Administer 1 Drop to both eyes two (2) times a day for 10 days. 3. Motion sickness, initial encounter/Travel anxiety.   -     ALPRAZolam (XANAX) 0.5 mg tablet; Take 1 Tab by mouth nightly as needed for Anxiety for up to 14 days. Max Daily Amount: 0.5 mg.    I have discussed the diagnosis with the patient and the intended treatment plan as seen in the above orders. The patient has received an after-visit summary and questions were answered concerning future plans. Asked to return should symptoms worsen or not improve with treatment. Any pending labs and studies will be relayed to patient when they become available. Pt verbalizes understanding of plan of care and denies further questions or concerns at this time. Follow-up and Dispositions    · Return if symptoms worsen or fail to improve. Jemal Bell MD    Patient Instructions   SINUSITIS    1. Please take the antibiotics (Z-mey) as directed. Take 2 tablets today, then 1 tablet daily x 4 days. 2. Please use the FLONASE (nasal spray) 2 squirts in each nostril just 1 x per day. 3. Please use NORMAL SALINE nasal spray (buy this over the counter) use 2-3 squirts in each nostril every 1-2 hours while awake. This will help to really clear up and move the secretions down.

## 2019-10-23 NOTE — PATIENT INSTRUCTIONS
SINUSITIS 1. Please take the antibiotics (Z-mey) as directed. Take 2 tablets today, then 1 tablet daily x 4 days. 2. Please use the FLONASE (nasal spray) 2 squirts in each nostril just 1 x per day. 3. Please use NORMAL SALINE nasal spray (buy this over the counter) use 2-3 squirts in each nostril every 1-2 hours while awake. This will help to really clear up and move the secretions down.

## 2019-11-21 ENCOUNTER — TELEPHONE (OUTPATIENT)
Dept: FAMILY MEDICINE CLINIC | Age: 54
End: 2019-11-21

## 2019-11-21 NOTE — TELEPHONE ENCOUNTER
Forwarded from the call center;      Pt is requesting a prescription for Azithromycin (not the [facility]) for a sinus infection she is still experiencing. Pt would like the prescription to be sent to [facility] in [place].

## 2019-11-21 NOTE — TELEPHONE ENCOUNTER
Spoke with patient and relayed the message. Patient voiced that \"It's not about my traveling. I've had these problems and been seeing Dr. Jovan Boland for years. I'll have to think about it\" referring to making an appointment to be seen.

## 2019-11-22 ENCOUNTER — OFFICE VISIT (OUTPATIENT)
Dept: FAMILY MEDICINE CLINIC | Age: 54
End: 2019-11-22

## 2019-11-22 VITALS
TEMPERATURE: 97.7 F | SYSTOLIC BLOOD PRESSURE: 100 MMHG | HEIGHT: 65 IN | RESPIRATION RATE: 20 BRPM | DIASTOLIC BLOOD PRESSURE: 72 MMHG | BODY MASS INDEX: 31.49 KG/M2 | WEIGHT: 189 LBS | OXYGEN SATURATION: 99 % | HEART RATE: 80 BPM

## 2019-11-22 DIAGNOSIS — J32.9 SINUSITIS, UNSPECIFIED CHRONICITY, UNSPECIFIED LOCATION: Primary | ICD-10-CM

## 2019-11-22 DIAGNOSIS — E04.9 ENLARGED THYROID: ICD-10-CM

## 2019-11-22 RX ORDER — ERYTHROMYCIN 333 MG/1
333 TABLET, DELAYED RELEASE ORAL 3 TIMES DAILY
Qty: 30 TAB | Refills: 0 | Status: SHIPPED | OUTPATIENT
Start: 2019-11-22 | End: 2019-12-02

## 2019-11-22 RX ORDER — PREDNISONE 10 MG/1
TABLET ORAL
Qty: 21 TAB | Refills: 0 | Status: SHIPPED | OUTPATIENT
Start: 2019-11-22 | End: 2021-08-18 | Stop reason: ALTCHOICE

## 2019-11-22 NOTE — PROGRESS NOTES
Identified pt with two pt identifiers(name and ). Chief Complaint   Patient presents with    Sinus Infection     she got better and they went away and when she came home it started all over again         Health Maintenance Due   Topic    Shingrix Vaccine Age 50> (1 of 2)    FOBT Q 1 YEAR AGE 50-75     BREAST CANCER SCRN MAMMOGRAM     PAP AKA CERVICAL CYTOLOGY        Wt Readings from Last 3 Encounters:   19 189 lb (85.7 kg)   10/23/19 190 lb (86.2 kg)   18 196 lb (88.9 kg)     Temp Readings from Last 3 Encounters:   19 97.7 °F (36.5 °C) (Oral)   10/23/19 97.8 °F (36.6 °C) (Oral)   18 97.8 °F (36.6 °C) (Oral)     BP Readings from Last 3 Encounters:   19 100/72   10/23/19 120/82   18 112/80     Pulse Readings from Last 3 Encounters:   19 80   10/23/19 81   18 83         Learning Assessment:  :     Learning Assessment 10/24/2018 2014   PRIMARY LEARNER Patient Patient   HIGHEST LEVEL OF EDUCATION - PRIMARY LEARNER  > 4 YEARS OF COLLEGE > 4 YEARS OF COLLEGE   BARRIERS PRIMARY LEARNER NONE NONE   CO-LEARNER CAREGIVER - No   PRIMARY LANGUAGE ENGLISH ENGLISH   LEARNER PREFERENCE PRIMARY DEMONSTRATION DEMONSTRATION     LISTENING READING     READING -   ANSWERED BY self self   RELATIONSHIP SELF SELF       Depression Screening:  :     3 most recent PHQ Screens 10/23/2019   Little interest or pleasure in doing things Not at all   Feeling down, depressed, irritable, or hopeless Not at all   Total Score PHQ 2 0       Fall Risk Assessment:  :     Fall Risk Assessment, last 12 mths 3/31/2017   Able to walk? Yes   Fall in past 12 months? No       Abuse Screening:  :     Abuse Screening Questionnaire 2019 10/24/2018 3/31/2017 2014   Do you ever feel afraid of your partner? N N N N   Are you in a relationship with someone who physically or mentally threatens you? N N N N   Is it safe for you to go home?  Mahesh Lama       Coordination of Care Questionnaire:  :     1) Have you been to an emergency room, urgent care clinic since your last visit? no   Hospitalized since your last visit? no             2) Have you seen or consulted any other health care providers outside of [address] [place] [address] since your last visit? no  Has ENT appt next wed (Include any pap smears or colon screenings in this section.)    3) Do you have an Advance Directive on file? yes  Are you interested in receiving information about Advance Directives? no    Reviewed record in preparation for visit and have obtained necessary documentation. Medication reconciliation up to date and corrected with patient at this time.

## 2019-11-24 NOTE — PROGRESS NOTES
HISTORY OF PRESENT ILLNESS  Alen Gonzalez is a 49 y.o. female. HPI  FU sinus congestion. She was seen last month for sinus infection and took Z Blas. Having recurrent sinus pressure and triggering blepharitis, too. Review of Systems   HENT: Positive for congestion and sinus pain. Eyes: Positive for discharge and redness. Neurological: Positive for headaches. Visit Vitals  /72 (BP 1 Location: Left arm, BP Patient Position: Sitting) Comment: manual   Pulse 80   Temp 97.7 °F (36.5 °C) (Oral)   Resp 20   Ht 5' 5\" (1.651 m)   Wt 189 lb (85.7 kg)   SpO2 99%   BMI 31.45 kg/m²       Physical Exam  Vitals signs reviewed. Constitutional:       Appearance: She is obese. HENT:      Head: Normocephalic. Right Ear: Tympanic membrane normal.      Left Ear: Tympanic membrane normal.      Nose: Nose normal.      Mouth/Throat:      Mouth: Mucous membranes are moist.      Pharynx: Oropharynx is clear. Eyes:      Conjunctiva/sclera:      Right eye: Right conjunctiva is injected. Left eye: Left conjunctiva is injected. Neck:      Thyroid: Thyromegaly present. Cardiovascular:      Rate and Rhythm: Normal rate and regular rhythm. Pulmonary:      Effort: Pulmonary effort is normal.      Breath sounds: Normal breath sounds. Neurological:      Mental Status: She is alert. ASSESSMENT and PLAN    ICD-10-CM ICD-9-CM    1. Sinusitis, unspecified chronicity, unspecified location J32.9 473.9 erythromycin (SACHA-TAB) 333 mg tablet      predniSONE (STERAPRED DS) 10 mg dose pack   2. Enlarged thyroid E04.9 240.9      Pt requesting erythromycin as this has worked well for her in past.  Encouraged to schedule CPE and labs.

## 2019-12-19 DIAGNOSIS — Z91.09 ENVIRONMENTAL ALLERGIES: ICD-10-CM

## 2019-12-19 RX ORDER — LEVOCETIRIZINE DIHYDROCHLORIDE 5 MG/1
TABLET, FILM COATED ORAL
Qty: 30 TAB | Refills: 0 | Status: SHIPPED | OUTPATIENT
Start: 2019-12-19 | End: 2021-08-18 | Stop reason: SINTOL

## 2020-12-04 ENCOUNTER — PATIENT MESSAGE (OUTPATIENT)
Dept: FAMILY MEDICINE CLINIC | Age: 55
End: 2020-12-04

## 2021-08-17 ENCOUNTER — TELEPHONE (OUTPATIENT)
Dept: FAMILY MEDICINE CLINIC | Age: 56
End: 2021-08-17

## 2021-08-17 NOTE — TELEPHONE ENCOUNTER
She can take tylenol or ibuprofen and apply a wam cloth compress over the area to help relieve the pain until her appt tomorrow. Is there anything you would like to add for me to advise her.

## 2021-08-17 NOTE — TELEPHONE ENCOUNTER
Pt will need to see me. LOV 11/2019. Try to offer 6/18 @ 4:30. Please explain she is getting worked in only for ear infection. Will need to schedule a CPE at later date.

## 2021-08-17 NOTE — TELEPHONE ENCOUNTER
----- Message from Lilly sent at 8/17/2021  8:03 AM EDT -----  Regarding: FW: Prescription Question  Contact: [phone]    ----- Message -----  From: Gm Vargas  Sent: 8/17/2021   4:27 AM EDT  To: [facility] Nurse Pool  Subject: Prescription Question                            Good day Dr. Lee Burden,  2 weeks ago I called the office because I had a sinus infection and was told no appointments were available and to go to [facility]. I went to the [facility] and was treated with a Azithromycin  3 pack for a sinus and ear infection. I was told to follow up with my physician. I still have the ear infection ; the ear feels hot and inflamed. If I cannot get an appointment to see you would it be possible to view my visit to [facility] and give me ear drops  to alleviate the discomfort? Thank you for your attention.    Blanca Theodore

## 2021-08-17 NOTE — TELEPHONE ENCOUNTER
Patient would like to know if there is something that she can take or do in the meantime for the ear pain. Has an apt for tomorrow with .  DINORA)[phone]

## 2021-08-18 ENCOUNTER — OFFICE VISIT (OUTPATIENT)
Dept: FAMILY MEDICINE CLINIC | Age: 56
End: 2021-08-18
Payer: COMMERCIAL

## 2021-08-18 VITALS
TEMPERATURE: 98.7 F | RESPIRATION RATE: 16 BRPM | SYSTOLIC BLOOD PRESSURE: 126 MMHG | DIASTOLIC BLOOD PRESSURE: 87 MMHG | BODY MASS INDEX: 31.65 KG/M2 | OXYGEN SATURATION: 98 % | HEART RATE: 95 BPM | HEIGHT: 65 IN | WEIGHT: 190 LBS

## 2021-08-18 DIAGNOSIS — R09.81 SINUS CONGESTION: Primary | ICD-10-CM

## 2021-08-18 DIAGNOSIS — H10.13 ALLERGIC CONJUNCTIVITIS OF BOTH EYES: ICD-10-CM

## 2021-08-18 DIAGNOSIS — F41.9 ANXIETY DISORDER, UNSPECIFIED TYPE: ICD-10-CM

## 2021-08-18 DIAGNOSIS — R39.82 CHRONIC BLADDER PAIN: ICD-10-CM

## 2021-08-18 PROCEDURE — 99213 OFFICE O/P EST LOW 20 MIN: CPT | Performed by: FAMILY MEDICINE

## 2021-08-18 RX ORDER — ALPRAZOLAM 0.5 MG/1
0.5 TABLET ORAL
Qty: 20 TABLET | Refills: 0 | Status: SHIPPED | OUTPATIENT
Start: 2021-08-18

## 2021-08-18 RX ORDER — PREDNISONE 10 MG/1
TABLET ORAL
Qty: 21 TABLET | Refills: 0 | Status: SHIPPED | OUTPATIENT
Start: 2021-08-18

## 2021-08-18 RX ORDER — KETOTIFEN FUMARATE 0.35 MG/ML
1 SOLUTION/ DROPS OPHTHALMIC 2 TIMES DAILY
Qty: 5 ML | Refills: 0 | COMMUNITY
Start: 2021-08-18

## 2021-08-18 RX ORDER — CEPHRADINE 500 MG
10000 CAPSULE ORAL
COMMUNITY
Start: 2014-02-28

## 2021-08-18 RX ORDER — FLUTICASONE PROPIONATE 50 MCG
2 SPRAY, SUSPENSION (ML) NASAL DAILY
COMMUNITY

## 2021-08-18 NOTE — PROGRESS NOTES
Subjective:   Noy Reeves presents for evaluation of Ear Pain (left ear - was seen at [facility] in [place] first week of this month, dx with ear infection and completed antibx tx - reports she felt better for about a week and now sx have resumed) and Eye Swelling (left eye started this week)     This started 2 weeks ago, and is unchanged since that time. She also reports headache, left sinus pain and left ear and eye pain. .  She denies a history of: fever. Treatments have included: Azithromycin. Relevant PMH: No pertinent additional PMH. Patient reports sick contacts: no  Pt C/O L ear pain since Sunday. Taking Mucinex, Sudafed. She has seen a naturopath provider for thyroid disease last year. Labs were done in 2020. She will request copy be sent to me. /87 (BP 1 Location: Left upper arm, BP Patient Position: Sitting, BP Cuff Size: Large adult)   Pulse 95   Temp 98.7 °F (37.1 °C) (Temporal)   Resp 16   Ht 5' 5\" (1.651 m)   Wt 190 lb (86.2 kg)   SpO2 98%   BMI 31.62 kg/m²     Physical Exam  General: alert, cooperative, no distress, appears stated age  Ear exam: normal TM's and external ear canals AU  Sinus exam: tenderness over left maxillary  Oropharynx exam: abnormal findings: mild oropharyngeal erythema  Neck exam: supple, symmetrical, trachea midline and no adenopathy  Heart exam: normal rate, regular rhythm, normal S1, S2, no murmurs, rubs, clicks or gallops  Lung exam: clear to auscultation bilaterally    Assessment/Plan:   1. Sinus congestion  -     predniSONE (STERAPRED DS) 10 mg dose pack; See administration instruction per 10mg dose pack, Normal, Disp-21 Tablet, R-0 (Patient not taking: Reported on 8/18/2021)  2. Chronic bladder pain  3. Anxiety disorder, unspecified type  -     ALPRAZolam (XANAX) 0.5 mg tablet; Take 1 Tablet by mouth nightly as needed for Anxiety. Max Daily Amount: 0.5 mg. Indications: anxious, Normal, Disp-20 Tablet, R-0 (Patient not taking: Reported on 8/18/2021)  4. Allergic conjunctivitis of both eyes           Return if symptoms worsen or fail to improve. An electronic signature was used to authenticate this note.   -- Juan Worrell MD

## 2021-08-18 NOTE — PROGRESS NOTES
Identified pt with two pt identifiers(name and ). Chief Complaint   Patient presents with    Ear Pain     left ear - was seen at [facility] [place] first week of this month, dx with ear infection and completed antibx tx - reports she felt better for about a week and now sx have resumed    Eye Swelling     left eye started this week        Health Maintenance Due   Topic    Hepatitis C Screening     A1C test (Diabetic or Prediabetic)     Colorectal Cancer Screening Combo     Shingrix Vaccine Age 50> (1 of 2)    Breast Cancer Screen Mammogram     Lipid Screen     PAP AKA CERVICAL CYTOLOGY        Wt Readings from Last 3 Encounters:   21 190 lb (86.2 kg)   19 189 lb (85.7 kg)   10/23/19 190 lb (86.2 kg)     Temp Readings from Last 3 Encounters:   21 98.7 °F (37.1 °C) (Temporal)   19 97.7 °F (36.5 °C) (Oral)   10/23/19 97.8 °F (36.6 °C) (Oral)     BP Readings from Last 3 Encounters:   21 126/87   19 100/72   10/23/19 120/82     Pulse Readings from Last 3 Encounters:   21 95   19 80   10/23/19 81         Learning Assessment:  :     Learning Assessment 2021 10/24/2018 2014   PRIMARY LEARNER Patient Patient Patient   HIGHEST LEVEL OF EDUCATION - PRIMARY LEARNER  4 YEARS OF COLLEGE > 4 YEARS OF COLLEGE > 4 YEARS OF COLLEGE   BARRIERS PRIMARY LEARNER NONE NONE NONE   CO-LEARNER CAREGIVER No - No   PRIMARY LANGUAGE ENGLISH ENGLISH ENGLISH   LEARNER PREFERENCE PRIMARY LISTENING DEMONSTRATION DEMONSTRATION     DEMONSTRATION LISTENING READING     - READING -   ANSWERED BY patient self self   RELATIONSHIP SELF SELF SELF       Depression Screening:  :     3 most recent PHQ Screens 2021   Little interest or pleasure in doing things Not at all   Feeling down, depressed, irritable, or hopeless Not at all   Total Score PHQ 2 0       Fall Risk Assessment:  :     Fall Risk Assessment, last 12 mths 3/31/2017   Able to walk? Yes   Fall in past 12 months?  No Abuse Screening:  :     Abuse Screening Questionnaire 8/18/2021 11/22/2019 10/24/2018 3/31/2017 6/11/2014   Do you ever feel afraid of your partner? N N N N N   Are you in a relationship with someone who physically or mentally threatens you? N N N N N   Is it safe for you to go home? Y Y Y Y Y       Coordination of Care Questionnaire:  :     1) Have you been to an emergency room, urgent care clinic since your last visit? no   Hospitalized since your last visit? no             2) Have you seen or consulted any other health care providers outside of [address] [place] [address] since your last visit? no  (Include any pap smears or colon screenings in this section.)    3) Do you have an Advance Directive on file? no  Are you interested in receiving information about Advance Directives? no    Patient is accompanied by self. Reviewed record in preparation for visit and have obtained necessary documentation. Medication reconciliation up to date and corrected with patient at this time.

## 2021-12-21 LAB — PAP SMEAR, EXTERNAL: NORMAL

## 2022-03-18 PROBLEM — R39.82 CHRONIC BLADDER PAIN: Status: ACTIVE | Noted: 2021-08-18

## 2022-06-03 LAB — SARS-COV-2, NAA: POSITIVE

## 2023-10-02 ENCOUNTER — TELEMEDICINE (OUTPATIENT)
Age: 58
End: 2023-10-02
Payer: COMMERCIAL

## 2023-10-02 DIAGNOSIS — H01.003 BLEPHARITIS OF BOTH EYES, UNSPECIFIED EYELID, UNSPECIFIED TYPE: Primary | ICD-10-CM

## 2023-10-02 DIAGNOSIS — H01.006 BLEPHARITIS OF BOTH EYES, UNSPECIFIED EYELID, UNSPECIFIED TYPE: Primary | ICD-10-CM

## 2023-10-02 PROCEDURE — 99213 OFFICE O/P EST LOW 20 MIN: CPT | Performed by: NURSE PRACTITIONER

## 2023-10-02 RX ORDER — ERYTHROMYCIN 5 MG/G
OINTMENT OPHTHALMIC
Qty: 3.5 G | Refills: 0 | Status: SHIPPED | OUTPATIENT
Start: 2023-10-02 | End: 2023-10-12

## 2023-10-02 RX ORDER — CICLOPIROX 1 G/100ML
SHAMPOO TOPICAL
COMMUNITY
Start: 2023-06-29

## 2023-10-02 SDOH — ECONOMIC STABILITY: HOUSING INSECURITY
IN THE LAST 12 MONTHS, WAS THERE A TIME WHEN YOU DID NOT HAVE A STEADY PLACE TO SLEEP OR SLEPT IN A SHELTER (INCLUDING NOW)?: NO

## 2023-10-02 SDOH — ECONOMIC STABILITY: INCOME INSECURITY: HOW HARD IS IT FOR YOU TO PAY FOR THE VERY BASICS LIKE FOOD, HOUSING, MEDICAL CARE, AND HEATING?: NOT HARD AT ALL

## 2023-10-02 SDOH — ECONOMIC STABILITY: FOOD INSECURITY: WITHIN THE PAST 12 MONTHS, THE FOOD YOU BOUGHT JUST DIDN'T LAST AND YOU DIDN'T HAVE MONEY TO GET MORE.: NEVER TRUE

## 2023-10-02 SDOH — ECONOMIC STABILITY: FOOD INSECURITY: WITHIN THE PAST 12 MONTHS, YOU WORRIED THAT YOUR FOOD WOULD RUN OUT BEFORE YOU GOT MONEY TO BUY MORE.: NEVER TRUE

## 2023-10-02 ASSESSMENT — PATIENT HEALTH QUESTIONNAIRE - PHQ9
2. FEELING DOWN, DEPRESSED OR HOPELESS: 0
SUM OF ALL RESPONSES TO PHQ QUESTIONS 1-9: 0
1. LITTLE INTEREST OR PLEASURE IN DOING THINGS: 0
SUM OF ALL RESPONSES TO PHQ9 QUESTIONS 1 & 2: 0
SUM OF ALL RESPONSES TO PHQ QUESTIONS 1-9: 0

## 2023-10-02 ASSESSMENT — ENCOUNTER SYMPTOMS
EYE ITCHING: 1
EYE PAIN: 1

## 2023-10-02 NOTE — PROGRESS NOTES
Ollie Cline, was evaluated through a synchronous (real-time) audio-video encounter. The patient (or guardian if applicable) is aware that this is a billable service, which includes applicable co-pays. This Virtual Visit was conducted with patient's (and/or legal guardian's) consent. Patient identification was verified, and a caregiver was present when appropriate. The patient was located at Home: [address]  [address] [facility]  Provider was located at Home ([address] [facility]): [address] [facility] (:  1965) is a Established patient, presenting virtually for evaluation of the following:    Assessment & Plan   Below is the assessment and plan developed based on review of pertinent history, physical exam, labs, studies, and medications. Diagnosis Orders   1. Blepharitis of both eyes, unspecified eyelid, unspecified type  erythromycin (ROMYCIN) 5 MG/GM ophthalmic ointment        Educated about using the ointment as prescribed  Should make in office appt, should symptoms continue and/or worsen    Should follow up with PCP in regards to xanax refill, with in office appt  Has not been seen in office in over 2 years    Pt informed to return to office with worsening of symptoms, or PRN with any questions or concerns. Pt verbalizes understanding of plan of care and denies further questions or concerns at this time. No follow-ups on file. Subjective   HPI  Pt presents with \"eye pain\"    She has hx of blepharitis, and states that it is usually triggered by allergies  She states that they are doing some new construction near where she lives, and she believes that this has triggered her flare  She states that in the past Dr Bob Tatum has treated her with antibiotic ointment, as well as prednisone  She would like to avoid prednisone if possible    She is also requesting refill of her Xanax, that she takes as needed for anxiety flares  Review of Systems   Constitutional:  Negative for fatigue.    Eyes:

## 2024-02-13 LAB — MAMMOGRAPHY, EXTERNAL: NORMAL

## 2024-04-09 ENCOUNTER — OFFICE VISIT (OUTPATIENT)
Age: 59
End: 2024-04-09
Payer: COMMERCIAL

## 2024-04-09 VITALS
HEIGHT: 65 IN | DIASTOLIC BLOOD PRESSURE: 80 MMHG | OXYGEN SATURATION: 100 % | SYSTOLIC BLOOD PRESSURE: 124 MMHG | BODY MASS INDEX: 32.76 KG/M2 | HEART RATE: 94 BPM | TEMPERATURE: 98.3 F | WEIGHT: 196.6 LBS | RESPIRATION RATE: 16 BRPM

## 2024-04-09 DIAGNOSIS — J01.01 ACUTE RECURRENT MAXILLARY SINUSITIS: Primary | ICD-10-CM

## 2024-04-09 PROCEDURE — 99213 OFFICE O/P EST LOW 20 MIN: CPT | Performed by: FAMILY MEDICINE

## 2024-04-09 RX ORDER — AZITHROMYCIN 250 MG/1
TABLET, FILM COATED ORAL
Qty: 6 TABLET | Refills: 0 | Status: SHIPPED | OUTPATIENT
Start: 2024-04-09 | End: 2024-04-19

## 2024-04-09 ASSESSMENT — PATIENT HEALTH QUESTIONNAIRE - PHQ9
1. LITTLE INTEREST OR PLEASURE IN DOING THINGS: NOT AT ALL
2. FEELING DOWN, DEPRESSED OR HOPELESS: NOT AT ALL
SUM OF ALL RESPONSES TO PHQ QUESTIONS 1-9: 0
SUM OF ALL RESPONSES TO PHQ9 QUESTIONS 1 & 2: 0

## 2024-04-09 NOTE — PROGRESS NOTES
Identified pt with two pt identifiers(name and ).    Chief Complaint   Patient presents with    Sinusitis     This started last week. Mucus is yellow and her teeth hurt at the top.         Health Maintenance Due   Topic    Hepatitis B vaccine (1 of 3 - 3-dose series)    A1C test (Diabetic or Prediabetic)     HIV screen     Hepatitis C screen     DTaP/Tdap/Td vaccine (1 - Tdap)    Lipids     Colorectal Cancer Screen     Shingles vaccine (1 of 2)    COVID-19 Vaccine (3 - 2023-24 season)    Breast cancer screen        Wt Readings from Last 3 Encounters:   24 89.2 kg (196 lb 9.6 oz)   21 86.2 kg (190 lb)     Temp Readings from Last 3 Encounters:   24 98.3 °F (36.8 °C) (Temporal)     BP Readings from Last 3 Encounters:   24 124/80   21 126/87     Pulse Readings from Last 3 Encounters:   24 94   21 95           Depression Screening:  :         2024     3:02 PM 10/2/2023    10:30 AM   PHQ-9 Questionaire   Little interest or pleasure in doing things 0 0   Feeling down, depressed, or hopeless 0 0   PHQ-9 Total Score 0 0        Fall Risk Assessment:  :          No data to display                 Abuse Screening:  :          No data to display                 Coordination of Care Questionnaire:  :     \"Have you been to the ER, urgent care clinic since your last visit?  Hospitalized since your last visit?\"    NO    “Have you seen or consulted any other health care providers outside of [facility] since your last visit?”    NO    “Have you had a colorectal cancer screening such as a colonoscopy/FIT/Cologuard?    NO    No colonoscopy on file  No cologuard on file  No FIT/FOBT on file   No flexible sigmoidoscopy on file        Have you had a mammogram?”   YES - Where: [address] [facility] Matilde Bhagat. [facility]  Nurse/CMA to request most recent records if not in the chart    Date of last Mammogram: 2021          
500mg on day 1 followed by 250mg on days 2 - 5         Will treat as above.  Informed that zpack is relatively weak and not usually the best option for sinusitis.  Multiple abx allergies.  She says that this has worked for her in the past.  Never had GI intolerance with this.  Push fluids. Continue nasal sprays.  Follow up if worsening or INI. Precautions given.    Return if symptoms worsen or fail to improve.     I have discussed the diagnosis with the patient and the intended treatment plan as seen in the above orders. The patient has received an after-visit summary and questions were answered concerning future plans. Asked to return should symptoms worsen or not improve with treatment. Any pending labs and studies will be relayed to patient when they become available.     Pt verbalizes understanding of plan of care and denies further questions or concerns at this time.     Michael Montague MD, FAAFP

## 2025-07-10 ENCOUNTER — TRANSCRIBE ORDERS (OUTPATIENT)
Facility: HOSPITAL | Age: 60
End: 2025-07-10

## 2025-07-10 DIAGNOSIS — R42 DIZZINESS AND GIDDINESS: Primary | ICD-10-CM

## 2025-07-10 DIAGNOSIS — E78.2 MIXED HYPERLIPIDEMIA: ICD-10-CM

## 2025-07-10 DIAGNOSIS — E11.9 DIABETES MELLITUS WITHOUT COMPLICATION (HCC): ICD-10-CM
